# Patient Record
Sex: MALE | Race: WHITE | Employment: OTHER | URBAN - METROPOLITAN AREA
[De-identification: names, ages, dates, MRNs, and addresses within clinical notes are randomized per-mention and may not be internally consistent; named-entity substitution may affect disease eponyms.]

---

## 2017-01-04 ENCOUNTER — HOSPITAL ENCOUNTER (OUTPATIENT)
Dept: OTHER | Age: 67
Discharge: HOME | End: 2017-01-04
Attending: UROLOGY | Admitting: UROLOGY

## 2017-01-04 DIAGNOSIS — N20.0 CALCULUS OF KIDNEY: ICD-10-CM

## 2017-01-16 ENCOUNTER — HOSPITAL ENCOUNTER (OUTPATIENT)
Dept: GENERAL RADIOLOGY | Age: 67
Discharge: HOME | End: 2017-01-16
Attending: UROLOGY | Admitting: UROLOGY

## 2017-01-16 DIAGNOSIS — N20.0 CALCULUS OF KIDNEY: ICD-10-CM

## 2017-01-16 RX ORDER — SODIUM CHLORIDE 9 MG/ML
INJECTION, SOLUTION INTRAVENOUS
Status: DISPENSED
Start: 2017-01-16 | End: 2017-01-16

## 2017-03-27 PROBLEM — R00.1 BRADYCARDIA: Status: ACTIVE | Noted: 2017-03-27

## 2017-03-27 PROBLEM — I48.0 PAF (PAROXYSMAL ATRIAL FIBRILLATION) (HCC): Status: ACTIVE | Noted: 2017-03-27

## 2017-03-27 PROBLEM — R55 NEAR SYNCOPE: Status: ACTIVE | Noted: 2017-03-27

## 2017-03-27 PROBLEM — I10 HTN (HYPERTENSION): Status: ACTIVE | Noted: 2017-03-27

## 2017-03-27 PROBLEM — K21.9 GERD (GASTROESOPHAGEAL REFLUX DISEASE): Status: ACTIVE | Noted: 2017-03-27

## 2017-03-27 PROBLEM — N40.0 BPH (BENIGN PROSTATIC HYPERPLASIA): Status: ACTIVE | Noted: 2017-03-27

## 2017-03-27 PROBLEM — R00.2 PALPITATIONS: Status: ACTIVE | Noted: 2017-03-27

## 2017-04-06 ENCOUNTER — OFFICE VISIT (OUTPATIENT)
Dept: CARDIOLOGY CLINIC | Age: 67
End: 2017-04-06

## 2017-04-06 ENCOUNTER — NURSE ONLY (OUTPATIENT)
Dept: CARDIOLOGY CLINIC | Age: 67
End: 2017-04-06

## 2017-04-06 VITALS
WEIGHT: 172 LBS | BODY MASS INDEX: 26.15 KG/M2 | SYSTOLIC BLOOD PRESSURE: 140 MMHG | HEART RATE: 79 BPM | DIASTOLIC BLOOD PRESSURE: 92 MMHG

## 2017-04-06 DIAGNOSIS — I10 ESSENTIAL HYPERTENSION: ICD-10-CM

## 2017-04-06 DIAGNOSIS — I48.0 PAF (PAROXYSMAL ATRIAL FIBRILLATION) (HCC): Primary | ICD-10-CM

## 2017-04-06 DIAGNOSIS — I48.0 PAROXYSMAL ATRIAL FIBRILLATION (HCC): Primary | ICD-10-CM

## 2017-04-06 PROCEDURE — 99214 OFFICE O/P EST MOD 30 MIN: CPT | Performed by: NURSE PRACTITIONER

## 2017-04-06 PROCEDURE — 93000 ELECTROCARDIOGRAM COMPLETE: CPT | Performed by: NURSE PRACTITIONER

## 2017-04-06 RX ORDER — PROPAFENONE HYDROCHLORIDE 150 MG/1
150 TABLET, FILM COATED ORAL EVERY 8 HOURS SCHEDULED
Qty: 270 TABLET | Refills: 1 | Status: SHIPPED | OUTPATIENT
Start: 2017-04-06 | End: 2017-05-18 | Stop reason: SDUPTHER

## 2017-04-06 RX ORDER — METOPROLOL SUCCINATE 25 MG/1
12.5 TABLET, EXTENDED RELEASE ORAL DAILY
Qty: 30 TABLET | Refills: 3 | Status: SHIPPED | OUTPATIENT
Start: 2017-04-06 | End: 2017-05-18

## 2017-05-08 ENCOUNTER — TELEPHONE (OUTPATIENT)
Dept: CARDIOLOGY CLINIC | Age: 67
End: 2017-05-08

## 2017-05-09 PROCEDURE — 93268 ECG RECORD/REVIEW: CPT | Performed by: INTERNAL MEDICINE

## 2017-05-18 ENCOUNTER — OFFICE VISIT (OUTPATIENT)
Dept: CARDIOLOGY CLINIC | Age: 67
End: 2017-05-18

## 2017-05-18 VITALS
SYSTOLIC BLOOD PRESSURE: 118 MMHG | WEIGHT: 169.25 LBS | BODY MASS INDEX: 25.73 KG/M2 | HEART RATE: 72 BPM | DIASTOLIC BLOOD PRESSURE: 80 MMHG

## 2017-05-18 DIAGNOSIS — I48.0 PAF (PAROXYSMAL ATRIAL FIBRILLATION) (HCC): Primary | ICD-10-CM

## 2017-05-18 DIAGNOSIS — I10 ESSENTIAL HYPERTENSION: ICD-10-CM

## 2017-05-18 PROCEDURE — 99213 OFFICE O/P EST LOW 20 MIN: CPT | Performed by: NURSE PRACTITIONER

## 2017-05-18 PROCEDURE — 93000 ELECTROCARDIOGRAM COMPLETE: CPT | Performed by: NURSE PRACTITIONER

## 2017-05-18 RX ORDER — METOPROLOL SUCCINATE 25 MG/1
12.5 TABLET, EXTENDED RELEASE ORAL PRN
COMMUNITY
End: 2018-06-19 | Stop reason: ALTCHOICE

## 2017-05-18 RX ORDER — PROPAFENONE HYDROCHLORIDE 150 MG/1
150 TABLET, FILM COATED ORAL EVERY 8 HOURS SCHEDULED
Qty: 270 TABLET | Refills: 3 | Status: SHIPPED | OUTPATIENT
Start: 2017-05-18 | End: 2017-08-24 | Stop reason: ALTCHOICE

## 2017-05-30 ENCOUNTER — HOSPITAL ENCOUNTER (OUTPATIENT)
Dept: OTHER | Age: 67
Discharge: HOME | End: 2017-05-30
Attending: UROLOGY | Admitting: UROLOGY

## 2017-05-30 LAB
CALCIUM SERPL-MCNC: 8.4 MG/DL (ref 8.3–10.6)
PARATHYROID HORMONE INTACT: 54.3 PG/ML (ref 14–72)

## 2017-06-05 ENCOUNTER — HOSPITAL ENCOUNTER (OUTPATIENT)
Dept: CT IMAGING | Age: 67
Discharge: HOME | End: 2017-06-05
Attending: UROLOGY | Admitting: UROLOGY

## 2017-06-05 DIAGNOSIS — N20.1 CALCULUS OF URETER: ICD-10-CM

## 2017-06-05 LAB
BUN BLDV-MCNC: 12 MG/DL (ref 7–20)
CREAT SERPL-MCNC: 0.8 MG/DL (ref 0.8–1.3)
GFR AFRICAN AMERICAN: >60
GFR NON-AFRICAN AMERICAN: >60
PROSTATE SPECIFIC ANTIGEN: 1.79 NG/ML (ref 0–4)

## 2017-08-18 DIAGNOSIS — I48.0 PAF (PAROXYSMAL ATRIAL FIBRILLATION) (HCC): ICD-10-CM

## 2017-08-24 ENCOUNTER — OFFICE VISIT (OUTPATIENT)
Dept: CARDIOLOGY CLINIC | Age: 67
End: 2017-08-24

## 2017-08-24 VITALS
WEIGHT: 170.6 LBS | BODY MASS INDEX: 25.94 KG/M2 | SYSTOLIC BLOOD PRESSURE: 124 MMHG | DIASTOLIC BLOOD PRESSURE: 88 MMHG | HEART RATE: 62 BPM

## 2017-08-24 DIAGNOSIS — I10 ESSENTIAL HYPERTENSION: ICD-10-CM

## 2017-08-24 DIAGNOSIS — I48.0 PAF (PAROXYSMAL ATRIAL FIBRILLATION) (HCC): Primary | ICD-10-CM

## 2017-08-24 PROCEDURE — 93000 ELECTROCARDIOGRAM COMPLETE: CPT | Performed by: NURSE PRACTITIONER

## 2017-08-24 PROCEDURE — 99213 OFFICE O/P EST LOW 20 MIN: CPT | Performed by: NURSE PRACTITIONER

## 2017-08-24 RX ORDER — PROPAFENONE HYDROCHLORIDE 225 MG/1
225 CAPSULE, EXTENDED RELEASE ORAL 2 TIMES DAILY
Qty: 60 CAPSULE | Refills: 5 | Status: SHIPPED | OUTPATIENT
Start: 2017-08-24 | End: 2018-02-21 | Stop reason: SDUPTHER

## 2017-11-07 ENCOUNTER — TELEPHONE (OUTPATIENT)
Dept: CARDIOLOGY CLINIC | Age: 67
End: 2017-11-07

## 2017-11-07 DIAGNOSIS — I48.0 PAROXYSMAL ATRIAL FIBRILLATION (HCC): Primary | ICD-10-CM

## 2017-11-07 NOTE — TELEPHONE ENCOUNTER
Pt wife calling, pt is on AFib medication for several months now, last night after work he came home from work, ate and he started in AFib for about 2-2.5 hours and then started to slow down. Right now he is back to normal at 61. Pt would like to know how this can happen if he's on medication for this? Pt has a few other questions too, would like to talk to [facility] about them.   Pls call to advise Thank you

## 2017-11-07 NOTE — TELEPHONE ENCOUNTER
Spoke with pt's wife. Pt had first episode of AF since starting Propafenone in the spring. Palpitations lasted for 2-2.5 hr, did not take Metoprolol. Wife believes he is apneic during the night with loud snoring. He has never had a sleep study. Referral placed to sleep medicine. No change in medications.

## 2018-02-12 ENCOUNTER — OFFICE VISIT (OUTPATIENT)
Dept: CARDIOLOGY CLINIC | Age: 68
End: 2018-02-12

## 2018-02-12 VITALS
OXYGEN SATURATION: 97 % | WEIGHT: 178.4 LBS | HEART RATE: 68 BPM | SYSTOLIC BLOOD PRESSURE: 136 MMHG | HEIGHT: 68 IN | DIASTOLIC BLOOD PRESSURE: 86 MMHG | BODY MASS INDEX: 27.04 KG/M2

## 2018-02-12 DIAGNOSIS — I48.0 PAF (PAROXYSMAL ATRIAL FIBRILLATION) (HCC): Primary | ICD-10-CM

## 2018-02-12 DIAGNOSIS — R00.2 PALPITATIONS: ICD-10-CM

## 2018-02-12 PROCEDURE — 3017F COLORECTAL CA SCREEN DOC REV: CPT | Performed by: INTERNAL MEDICINE

## 2018-02-12 PROCEDURE — G8419 CALC BMI OUT NRM PARAM NOF/U: HCPCS | Performed by: INTERNAL MEDICINE

## 2018-02-12 PROCEDURE — 93000 ELECTROCARDIOGRAM COMPLETE: CPT | Performed by: INTERNAL MEDICINE

## 2018-02-12 PROCEDURE — 1036F TOBACCO NON-USER: CPT | Performed by: INTERNAL MEDICINE

## 2018-02-12 PROCEDURE — 4040F PNEUMOC VAC/ADMIN/RCVD: CPT | Performed by: INTERNAL MEDICINE

## 2018-02-12 PROCEDURE — G8484 FLU IMMUNIZE NO ADMIN: HCPCS | Performed by: INTERNAL MEDICINE

## 2018-02-12 PROCEDURE — 99214 OFFICE O/P EST MOD 30 MIN: CPT | Performed by: INTERNAL MEDICINE

## 2018-02-12 PROCEDURE — G8427 DOCREV CUR MEDS BY ELIG CLIN: HCPCS | Performed by: INTERNAL MEDICINE

## 2018-02-12 PROCEDURE — 1123F ACP DISCUSS/DSCN MKR DOCD: CPT | Performed by: INTERNAL MEDICINE

## 2018-02-12 NOTE — PROGRESS NOTES
Colonoscopy (10/28/14). Social History:   reports that he has never smoked. He has never used smokeless tobacco. He reports that he does not drink alcohol or use drugs. Family History:  family history includes Cancer in his brother, brother, and mother; Heart Disease in his brother, father, and sister. Home Medications:    Current Outpatient Prescriptions:     propafenone (RYTHMOL SR) 225 MG extended release capsule, Take 1 capsule by mouth 2 times daily, Disp: 60 capsule, Rfl: 5    metoprolol succinate (TOPROL XL) 25 MG extended release tablet, Take 12.5 mg by mouth as needed, Disp: , Rfl:     ranitidine (ZANTAC) 150 MG tablet, Take 150 mg by mouth daily, Disp: , Rfl:     aspirin 81 MG tablet, Take 81 mg by mouth daily, Disp: , Rfl:     Multiple Vitamins-Minerals (MULTIVITAMIN ADULT PO), Take 1 tablet by mouth daily, Disp: , Rfl:     MAGNESIUM PO, Take 1 capsule by mouth daily, Disp: , Rfl:     Coenzyme Q10 (COQ10 PO), Take 1 tablet by mouth daily, Disp: , Rfl:     Omega-3 Fatty Acids (OMEGA 3 PO), Take 1 tablet by mouth daily, Disp: , Rfl:       Allergies:  Review of patient's allergies indicates no known allergies. Review of Systems   Constitutional: Negative for activity change and appetite change. HENT: Negative for facial swelling and neck pain. Eyes: Negative for discharge and itching. Respiratory: Resps easy, unlabored, - chest pain and SOB  Cardiovascular: + palpitations. Negative for leg swelling. Gastrointestinal: Negative for abdominal pain and abdominal distention. Genitourinary: Negative. Musculoskeletal: Negative. Skin: Negative for color change and pallor. Neurological: Negative for dizziness, syncope and light-headedness. Hematological: Negative. Psychiatric/Behavioral: Negative for behavioral problems and agitation.     /86 (Site: Left Arm, Position: Sitting, Cuff Size: Large Adult)   Pulse 68   Ht 5' 8\" (1.727 m)   Wt 178 lb 6.4 oz

## 2018-02-21 ENCOUNTER — TELEPHONE (OUTPATIENT)
Dept: CARDIOLOGY CLINIC | Age: 68
End: 2018-02-21

## 2018-02-21 RX ORDER — PROPAFENONE HYDROCHLORIDE 225 MG/1
225 CAPSULE, EXTENDED RELEASE ORAL 2 TIMES DAILY
Qty: 60 CAPSULE | Refills: 5 | Status: SHIPPED | OUTPATIENT
Start: 2018-02-21 | End: 2018-08-23 | Stop reason: SDUPTHER

## 2018-02-21 NOTE — TELEPHONE ENCOUNTER
Last OV 2/12/2018, propafenone was not refilled at this visit. Approved refill per assessment of visit  Assessment:  1. Atrial Fibrillation, unspecified--intermittent episodes describes has racing/skipped heart beats that worse with stress. His [id number] score is 1. The risks, benefits, alternatives related to anticoagulation were reviewed. He declined NOAC therapy at this time, will continue ASA.     Continue Propafenone 225 mg bid

## 2018-03-05 ENCOUNTER — HOSPITAL ENCOUNTER (OUTPATIENT)
Dept: OTHER | Age: 68
Discharge: HOME | End: 2018-03-05
Attending: UROLOGY | Admitting: UROLOGY

## 2018-03-05 DIAGNOSIS — N20.1 CALCULUS OF URETER: ICD-10-CM

## 2018-03-08 ENCOUNTER — HOSPITAL ENCOUNTER (OUTPATIENT)
Dept: ULTRASOUND IMAGING | Age: 68
Discharge: HOME | End: 2018-03-08
Attending: UROLOGY | Admitting: UROLOGY

## 2018-03-08 DIAGNOSIS — R31.21 ASYMPTOMATIC MICROSCOPIC HEMATURIA: ICD-10-CM

## 2018-04-20 ENCOUNTER — TELEPHONE (OUTPATIENT)
Dept: FAMILY MEDICINE CLINIC | Age: 68
End: 2018-04-20

## 2018-04-20 ENCOUNTER — HOSPITAL ENCOUNTER (OUTPATIENT)
Dept: CT IMAGING | Age: 68
Discharge: HOME | End: 2018-04-20
Attending: UROLOGY | Admitting: UROLOGY

## 2018-04-20 DIAGNOSIS — N20.1 CALCULUS OF URETER: ICD-10-CM

## 2018-05-30 ENCOUNTER — TELEPHONE (OUTPATIENT)
Dept: CARDIOLOGY CLINIC | Age: 68
End: 2018-05-30

## 2018-06-12 ENCOUNTER — TELEPHONE (OUTPATIENT)
Dept: CARDIOLOGY CLINIC | Age: 68
End: 2018-06-12

## 2018-06-22 ENCOUNTER — OFFICE VISIT (OUTPATIENT)
Dept: VASCULAR SURGERY | Age: 68
End: 2018-06-22

## 2018-06-22 VITALS
DIASTOLIC BLOOD PRESSURE: 80 MMHG | HEIGHT: 68 IN | BODY MASS INDEX: 26.22 KG/M2 | SYSTOLIC BLOOD PRESSURE: 124 MMHG | WEIGHT: 173 LBS

## 2018-06-22 DIAGNOSIS — I72.8 SPLENIC ARTERY ANEURYSM (HCC): Primary | ICD-10-CM

## 2018-06-22 PROCEDURE — 1123F ACP DISCUSS/DSCN MKR DOCD: CPT | Performed by: SURGERY

## 2018-06-22 PROCEDURE — 99203 OFFICE O/P NEW LOW 30 MIN: CPT | Performed by: SURGERY

## 2018-06-22 PROCEDURE — G8427 DOCREV CUR MEDS BY ELIG CLIN: HCPCS | Performed by: SURGERY

## 2018-06-22 PROCEDURE — 3017F COLORECTAL CA SCREEN DOC REV: CPT | Performed by: SURGERY

## 2018-06-22 PROCEDURE — 1036F TOBACCO NON-USER: CPT | Performed by: SURGERY

## 2018-06-22 PROCEDURE — G8419 CALC BMI OUT NRM PARAM NOF/U: HCPCS | Performed by: SURGERY

## 2018-06-22 PROCEDURE — 4040F PNEUMOC VAC/ADMIN/RCVD: CPT | Performed by: SURGERY

## 2018-06-27 NOTE — TELEPHONE ENCOUNTER
Pt wife calling stating pt needs a refill of Metoprolol (I don't see it listed on current medication list) [address] [phone]. Pt is in AFib again.   Pls call to advise Thank you

## 2018-06-29 PROBLEM — N20.0 RENAL STONES: Status: ACTIVE | Noted: 2018-06-29

## 2018-07-03 RX ORDER — METOPROLOL SUCCINATE 25 MG/1
12.5 TABLET, EXTENDED RELEASE ORAL DAILY PRN
Qty: 90 TABLET | Refills: 3 | Status: SHIPPED | OUTPATIENT
Start: 2018-07-03 | End: 2018-09-10 | Stop reason: SDUPTHER

## 2018-07-03 NOTE — TELEPHONE ENCOUNTER
Has this been taken care of? I don't see where the this medication has been ordered.  Pls advise Thank you

## 2018-07-10 ENCOUNTER — HOSPITAL ENCOUNTER (OUTPATIENT)
Dept: OTHER | Age: 68
Discharge: HOME | End: 2018-07-10
Attending: UROLOGY | Admitting: UROLOGY

## 2018-07-10 DIAGNOSIS — R52 PAIN: ICD-10-CM

## 2018-07-31 ENCOUNTER — HOSPITAL ENCOUNTER (OUTPATIENT)
Dept: SURGERY | Age: 68
Discharge: HOME | End: 2018-07-31
Attending: UROLOGY | Admitting: UROLOGY

## 2018-07-31 VITALS
WEIGHT: 169.2 LBS | RESPIRATION RATE: 18 BRPM | TEMPERATURE: 98.3 F | BODY MASS INDEX: 25.64 KG/M2 | HEIGHT: 68 IN | OXYGEN SATURATION: 93 % | HEART RATE: 89 BPM | SYSTOLIC BLOOD PRESSURE: 144 MMHG | DIASTOLIC BLOOD PRESSURE: 90 MMHG

## 2018-07-31 DIAGNOSIS — N20.1 CALCULUS OF URETER: ICD-10-CM

## 2018-07-31 LAB
ANION GAP SERPL CALCULATED.3IONS-SCNC: 12 MMOL/L (ref 3–16)
BUN BLDV-MCNC: 8 MG/DL (ref 7–20)
CALCIUM SERPL-MCNC: 9.3 MG/DL (ref 8.3–10.6)
CHLORIDE BLD-SCNC: 106 MMOL/L (ref 99–110)
CO2: 24 MMOL/L (ref 21–32)
CREAT SERPL-MCNC: 0.9 MG/DL (ref 0.8–1.3)
GFR AFRICAN AMERICAN: >60
GFR NON-AFRICAN AMERICAN: >60
GLUCOSE BLD-MCNC: 101 MG/DL (ref 70–99)
HCT VFR BLD CALC: 46.6 % (ref 40.5–52.5)
HEMOGLOBIN: 16.1 G/DL (ref 13.5–17.5)
MCH RBC QN AUTO: 30.6 PG (ref 26–34)
MCHC RBC AUTO-ENTMCNC: 34.5 G/DL (ref 31–36)
MCV RBC AUTO: 88.5 FL (ref 80–100)
PDW BLD-RTO: 13.4 % (ref 12.4–15.4)
PLATELET # BLD: 234 K/UL (ref 135–450)
PMV BLD AUTO: 8.8 FL (ref 5–10.5)
POTASSIUM SERPL-SCNC: 4.2 MMOL/L (ref 3.5–5.1)
RBC # BLD: 5.26 M/UL (ref 4.2–5.9)
SODIUM BLD-SCNC: 142 MMOL/L (ref 136–145)
WBC # BLD: 9.5 K/UL (ref 4–11)

## 2018-07-31 RX ORDER — SODIUM CHLORIDE 0.9 % (FLUSH) 0.9 %
10 SYRINGE (ML) INJECTION PRN
Status: DISCONTINUED | OUTPATIENT
Start: 2018-07-31 | End: 2018-08-01 | Stop reason: HOSPADM

## 2018-07-31 RX ORDER — CEFAZOLIN SODIUM 2 G/100ML
2 INJECTION, SOLUTION INTRAVENOUS
Status: COMPLETED | OUTPATIENT
Start: 2018-07-31 | End: 2018-07-31

## 2018-07-31 RX ORDER — HYDROMORPHONE HCL 110MG/55ML
0.5 PATIENT CONTROLLED ANALGESIA SYRINGE INTRAVENOUS EVERY 5 MIN PRN
Status: DISCONTINUED | OUTPATIENT
Start: 2018-07-31 | End: 2018-08-01 | Stop reason: HOSPADM

## 2018-07-31 RX ORDER — SODIUM CHLORIDE, SODIUM LACTATE, POTASSIUM CHLORIDE, CALCIUM CHLORIDE 600; 310; 30; 20 MG/100ML; MG/100ML; MG/100ML; MG/100ML
INJECTION, SOLUTION INTRAVENOUS CONTINUOUS
Status: DISCONTINUED | OUTPATIENT
Start: 2018-07-31 | End: 2018-08-01 | Stop reason: HOSPADM

## 2018-07-31 RX ORDER — MEPERIDINE HYDROCHLORIDE 25 MG/ML
12.5 INJECTION INTRAMUSCULAR; INTRAVENOUS; SUBCUTANEOUS EVERY 5 MIN PRN
Status: DISCONTINUED | OUTPATIENT
Start: 2018-07-31 | End: 2018-08-01 | Stop reason: HOSPADM

## 2018-07-31 RX ORDER — LABETALOL HYDROCHLORIDE 5 MG/ML
5 INJECTION, SOLUTION INTRAVENOUS EVERY 10 MIN PRN
Status: DISCONTINUED | OUTPATIENT
Start: 2018-07-31 | End: 2018-08-01 | Stop reason: HOSPADM

## 2018-07-31 RX ORDER — HYDROCODONE BITARTRATE AND ACETAMINOPHEN 5; 325 MG/1; MG/1
1 TABLET ORAL EVERY 6 HOURS PRN
Qty: 20 TABLET | Refills: 0 | Status: SHIPPED | OUTPATIENT
Start: 2018-07-31 | End: 2018-08-05

## 2018-07-31 RX ORDER — ONDANSETRON 2 MG/ML
4 INJECTION INTRAMUSCULAR; INTRAVENOUS
Status: ACTIVE | OUTPATIENT
Start: 2018-07-31 | End: 2018-07-31

## 2018-07-31 RX ORDER — HYDRALAZINE HYDROCHLORIDE 20 MG/ML
5 INJECTION INTRAMUSCULAR; INTRAVENOUS EVERY 10 MIN PRN
Status: DISCONTINUED | OUTPATIENT
Start: 2018-07-31 | End: 2018-08-01 | Stop reason: HOSPADM

## 2018-07-31 RX ORDER — LIDOCAINE HYDROCHLORIDE 10 MG/ML
1 INJECTION, SOLUTION EPIDURAL; INFILTRATION; INTRACAUDAL; PERINEURAL
Status: ACTIVE | OUTPATIENT
Start: 2018-07-31 | End: 2018-07-31

## 2018-07-31 RX ORDER — SODIUM CHLORIDE 0.9 % (FLUSH) 0.9 %
10 SYRINGE (ML) INJECTION EVERY 12 HOURS SCHEDULED
Status: DISCONTINUED | OUTPATIENT
Start: 2018-07-31 | End: 2018-08-01 | Stop reason: HOSPADM

## 2018-07-31 RX ORDER — OXYCODONE HYDROCHLORIDE AND ACETAMINOPHEN 5; 325 MG/1; MG/1
1 TABLET ORAL
Status: ACTIVE | OUTPATIENT
Start: 2018-07-31 | End: 2018-07-31

## 2018-07-31 RX ORDER — LIDOCAINE HYDROCHLORIDE 10 MG/ML
0.5 INJECTION, SOLUTION EPIDURAL; INFILTRATION; INTRACAUDAL; PERINEURAL ONCE
Status: DISCONTINUED | OUTPATIENT
Start: 2018-07-31 | End: 2018-08-01 | Stop reason: HOSPADM

## 2018-07-31 RX ADMIN — SODIUM CHLORIDE, SODIUM LACTATE, POTASSIUM CHLORIDE, CALCIUM CHLORIDE: 600; 310; 30; 20 INJECTION, SOLUTION INTRAVENOUS at 11:58

## 2018-07-31 RX ADMIN — CEFAZOLIN SODIUM 2 G: 2 INJECTION, SOLUTION INTRAVENOUS at 13:03

## 2018-07-31 ASSESSMENT — PAIN DESCRIPTION - LOCATION: LOCATION: FLANK

## 2018-07-31 ASSESSMENT — PAIN DESCRIPTION - ORIENTATION: ORIENTATION: LEFT

## 2018-07-31 ASSESSMENT — PAIN SCALES - GENERAL: PAINLEVEL_OUTOF10: 5

## 2018-07-31 ASSESSMENT — PAIN DESCRIPTION - PAIN TYPE: TYPE: SURGICAL PAIN

## 2018-07-31 NOTE — PROGRESS NOTES
Pt arrived from pacu in stable condition. VSS. Pt alert and oriented. Pt states his surgical pain is a 4-5/10. Pt denies any nausea. Stent with string in place, pt aware. Will provided pt with PO fluids and crackers. Outpatient pharmacy called. Will bring family to bedside. Will give primary RN, Ruchi, report.

## 2018-07-31 NOTE — OP NOTE
Urology Operative Report  [facility]    Provider: Michaele Mortimer MD Patient ID:  Admission Date: 2018 Name: Jorge Levi  OR Date: 2018  MRN: [id number]   Patient Location: Room/bed info not found : 1950  Attending: Michaele Mortimer, MD Date of Service: 2018  PCP: Jessica Stewart MD     Date of Operation: 2018    Preoperative Diagnosis: LEFT side renal stone    Postoperative Diagnosis: same    Procedure:    1. Cystoscopy  2. Left side ureteral stent removal  3. Left ureteroscopy  4. Laser lithotripsy  5. Basket extraction of stone  6. Left side ureteral stent placement  7. Fluoroscopy <1hr MD time  8. Left side retrograde ureteropyelogram    Surgeon:   Michaele Mortimer, MD    Anesthesia: General anesthesia    Indications: Jorge Levi is a 80 y.o. male who presents for the above named surgery. Informed consent was obtained and the risks, benefits, and details of the procedure were explained to the patient who elected to proceed. Details of Procedure: The patient was brought to the operating room and placed in the supine position on the operating room table. SCDs were placed on the lower extremities. Following induction of anesthesia the patient was positioned in a lithotomy position, all pressure points were padded, and the genitals were prepped and draped in the usual sterile fashion. A routine timeout was performed, confirming the patient, procedure, site, risk of fire, patient allergies and confirming that preoperative antibiotics had been administered prior to beginning. A 21 fr rigid cystoscope was advance via a normal appearing urethra into the bladder. The bladder was inspected and there were no suspicious lesions, stones or diverticula seen. Attention was turned to the left ureteral orifice and the previously placed ureteral stent was gasped and removed to the urethral meatus.  A 0.035 sensor wire was advanced via the lumen of the stent and positioned within the collecting system under control of fluoroscopy. A 5 fr pollock cathter was positioned and a RUPG was done to identify the anatomy. there was no extravasation, and no evidence of stricture. The wire was replaced. Over the wire a 11/13 by 36 cm ureteral access sheath was positioned within the ureter. A flexible ureteroscopy was advanced until the stone was identified. A 200nm laser fiber was used to perform lithotripsy until all stone burden was fragmented. Multiple small stone fragments were grasped and removed using a 0-tip nitinol basket. This was continued until all significant stone burden was removed. The ureteroscope was advanced up to the renal collecting system which was inspected systematically and no residual stone burden was seen. The working wire was replaced into the collecting system under vision. The ureter was inspected on removal of the scope and access sheath and no additional stone burden was seen. The cystoscope was back-loaded over the wire and a stent was advanced under control of fluoroscopy with good curl in the renal pelvis and the urinary bladder. A string was left attached to the distal stent and brought out through the urethral meatus. The bladder was emptied and the scope removed. At the end of the procedure all counts were correct. The patient tolerated the procedure well and was transported to the PACU in stable condition. Findings: successful treatment of the remaining renal stone burden    Estimated Blood Loss: minimal                  Drains: 6fr x 26cm left ureteral stent          Specimens: none    Complications: none apparent           Disposition:  PACU - hemodynamically stable.             Santa Barrientos MD  7/31/2018

## 2018-07-31 NOTE — PROGRESS NOTES
Pt discharged home per private vehicle with a  responsible adult who states they will be with them for the next 24 hours. Wheeled to front of the hospital by [facility].

## 2018-07-31 NOTE — PROGRESS NOTES
Arrived from cysto to pacu. Not yet awake. Resps adequate on O2 per simple mask and oral airway. VSS.

## 2018-08-24 RX ORDER — PROPAFENONE HYDROCHLORIDE 225 MG/1
225 CAPSULE, EXTENDED RELEASE ORAL 2 TIMES DAILY
Qty: 60 CAPSULE | Refills: 5 | Status: SHIPPED | OUTPATIENT
Start: 2018-08-24 | End: 2018-09-10 | Stop reason: SDUPTHER

## 2018-09-10 ENCOUNTER — OFFICE VISIT (OUTPATIENT)
Dept: CARDIOLOGY CLINIC | Age: 68
End: 2018-09-10

## 2018-09-10 VITALS
BODY MASS INDEX: 26.43 KG/M2 | HEIGHT: 68 IN | HEART RATE: 70 BPM | SYSTOLIC BLOOD PRESSURE: 138 MMHG | WEIGHT: 174.4 LBS | DIASTOLIC BLOOD PRESSURE: 88 MMHG

## 2018-09-10 DIAGNOSIS — I48.0 PAF (PAROXYSMAL ATRIAL FIBRILLATION) (HCC): Primary | ICD-10-CM

## 2018-09-10 PROCEDURE — G8419 CALC BMI OUT NRM PARAM NOF/U: HCPCS | Performed by: INTERNAL MEDICINE

## 2018-09-10 PROCEDURE — 4040F PNEUMOC VAC/ADMIN/RCVD: CPT | Performed by: INTERNAL MEDICINE

## 2018-09-10 PROCEDURE — 99214 OFFICE O/P EST MOD 30 MIN: CPT | Performed by: INTERNAL MEDICINE

## 2018-09-10 PROCEDURE — 1123F ACP DISCUSS/DSCN MKR DOCD: CPT | Performed by: INTERNAL MEDICINE

## 2018-09-10 PROCEDURE — 3017F COLORECTAL CA SCREEN DOC REV: CPT | Performed by: INTERNAL MEDICINE

## 2018-09-10 PROCEDURE — G8427 DOCREV CUR MEDS BY ELIG CLIN: HCPCS | Performed by: INTERNAL MEDICINE

## 2018-09-10 PROCEDURE — 1036F TOBACCO NON-USER: CPT | Performed by: INTERNAL MEDICINE

## 2018-09-10 PROCEDURE — 93000 ELECTROCARDIOGRAM COMPLETE: CPT | Performed by: INTERNAL MEDICINE

## 2018-09-10 PROCEDURE — 1101F PT FALLS ASSESS-DOCD LE1/YR: CPT | Performed by: INTERNAL MEDICINE

## 2018-09-10 RX ORDER — METOPROLOL SUCCINATE 25 MG/1
12.5 TABLET, EXTENDED RELEASE ORAL DAILY PRN
Qty: 90 TABLET | Refills: 3 | Status: SHIPPED | OUTPATIENT
Start: 2018-09-10 | End: 2020-08-13 | Stop reason: ALTCHOICE

## 2018-09-10 RX ORDER — PROPAFENONE HYDROCHLORIDE 225 MG/1
225 CAPSULE, EXTENDED RELEASE ORAL 2 TIMES DAILY
Qty: 180 CAPSULE | Refills: 5 | Status: SHIPPED | OUTPATIENT
Start: 2018-09-10 | End: 2019-03-26 | Stop reason: SDUPTHER

## 2018-09-10 RX ORDER — PROPAFENONE HYDROCHLORIDE 225 MG/1
225 CAPSULE, EXTENDED RELEASE ORAL 2 TIMES DAILY
Qty: 180 CAPSULE | Refills: 5 | Status: SHIPPED | OUTPATIENT
Start: 2018-09-10 | End: 2018-09-10 | Stop reason: SDUPTHER

## 2018-09-10 NOTE — PROGRESS NOTES
[address]   Cardiac Follow up    Referring Provider:  Jessica Stewart MD     Chief Complaint   Patient presents with    Atrial Fibrillation     on propafenone    Palpitations         HPI:  Jorge Levi is a 80 y.o. male seen in hospital for consultation of paroxsymal atrial fibrillation. He has a history of Atrial Fibrillation, Splenic Artery Aneurysm, Diverticulitis, and HTN. He reports that for a couple of years he has had worsening afib episodes and at the time they occur his pulse oximetry shows his HR in the 140's. He was admitted for likely brief episodes of afib which he felt and caused significant fear. He has a low [id number] score of 1( age, ? Ht though) by his hx. He has hematuria from kidney stones. He has a neg stress test a year ago. He does not smoke or drink. He only has sx when feeling an irregular heart beat. He was started on Propafenone on discharge. Referral for sleep study was done by SR NP in 8/17 due to call from wife stating pt going into Afib for a few hours at a time intermittently. She states she believes he has sleep apnea because he snores. Sleep study was not completed due to time constraints. Stress and illness seems to bring Afib on more frequently. Pt not on NOAC at this time because of employment being in an area that is high risk for injury. Pt states his brother had GI bleed and many surgeries due to taking NOAC. Is taking ASA 81mg daily. Has Rx for metropolol, but has not needed to take it.  arrives to office today in [facility]. He states his last episode of atrial fib was two weeks ago. Discussed NOAC, pt states he is not interested at this time. Patient denies lightheadedness, dizziness, chest pain, palpitations, orthopnea, edema, presyncope or syncope. Past Medical History:   has a past medical history of Atrial fibrillation ([address] Chronic kidney disease; Diverticulitis; GERD (gastroesophageal reflux disease);  Kidney Musculoskeletal: Negative. Skin: Negative for color change and pallor. Neurological: Negative for dizziness, syncope and light-headedness. Hematological: Negative. Psychiatric/Behavioral: Negative for behavioral problems and agitation. /88 (Site: Left Upper Arm, Position: Sitting, Cuff Size: Medium Adult)   Pulse 70   Ht 5' 8\" (1.727 m)   Wt 174 lb 6.4 oz (79.1 kg)   BMI 26.52 kg/m²   Stable. LABS:   Cr 0.9 (7/2018)    Objective:  Physical Exam   Nursing note and vitals reviewed. Constitutional: He appears well-developed and well-nourished. Head: atraumatic. facial nevus and left facial mole  Eyes: Right eye exhibits no discharge. Left eye exhibits no discharge. Neck: Neck supple. Cardiovascular: Normal rate, regular rhythm and normal heart sounds. Pulmonary/Chest: Effort normal and breath sounds normal.   Abdominal: Soft. Musculoskeletal: He exhibits no edema. Neurological: He is alert without any gross abnormalities. Skin: Skin is warm and dry. Psychiatric: He has a normal mood and affect. ECHO: 3/27/2017   -Normal left ventricle size and systolic function with an estimated ejection   fraction of 60-65%.  -No regional wall motion abnormalities are seen.   -Borderline concentric left ventricular hypertrophy is present.   -Normal diastolic function. E/e'= 7.2 .   -There is trivial mitral and tricuspid regurgitation with RVSP estimated at   26 mmHg. Stress ECHO: 12/21/15  Normal stress study    ECG:   Sinus  Rhythm  -First degree A-V block   -Right bundle branch block with left axis -bifascicular block. 70bpm    MCOT:  Completed 4/2017    Anticoagulation:  ASA - [id number] is 1    Assessment:  1. Atrial Fibrillation, Paroxysmal- intermittent episodes describes has racing/skipped heart beats. His [id number] score is 1, continue aspirin 81mg daily  The risks, benefits, alternatives related to anticoagulation were reviewed.  He declined NOAC therapy at this

## 2018-09-10 NOTE — PATIENT INSTRUCTIONS
Patient Education        Atrial Fibrillation: Care Instructions  Your Care Instructions    Atrial fibrillation is an irregular and often fast heartbeat. Treating this condition is important for several reasons. It can cause blood clots, which can travel from your heart to your brain and cause a stroke. If you have a fast heartbeat, you may feel lightheaded, dizzy, and weak. An irregular heartbeat can also increase your risk for heart failure. Atrial fibrillation is often the result of another heart condition, such as high blood pressure or coronary artery disease. Making changes to improve your heart condition will help you stay healthy and active. Follow-up care is a key part of your treatment and safety. Be sure to make and go to all appointments, and call your doctor if you are having problems. It's also a good idea to know your test results and keep a list of the medicines you take. How can you care for yourself at home? Medicines    · Take your medicines exactly as prescribed. Call your doctor if you think you are having a problem with your medicine. You will get more details on the specific medicines your doctor prescribes.     · If your doctor has given you a blood thinner to prevent a stroke, be sure you get instructions about how to take your medicine safely. Blood thinners can cause serious bleeding problems.     · Do not take any vitamins, over-the-counter drugs, or herbal products without talking to your doctor first.    Lifestyle changes    · Do not smoke. Smoking can increase your chance of a stroke and heart attack. If you need help quitting, talk to your doctor about stop-smoking programs and medicines. These can increase your chances of quitting for good.     · Eat a heart-healthy diet.     · Stay at a healthy weight. Lose weight if you need to.     · Limit alcohol to 2 drinks a day for men and 1 drink a day for women. Too much alcohol can cause health problems.     · Avoid colds and flu.  Get a pneumococcal vaccine shot. If you have had one before, ask your doctor whether you need another dose. Get a flu shot every year. If you must be around people with colds or flu, wash your hands often. Activity    · If your doctor recommends it, get more exercise. Walking is a good choice. Bit by bit, increase the amount you walk every day. Try for at least 30 minutes on most days of the week. You also may want to swim, bike, or do other activities. Your doctor may suggest that you join a cardiac rehabilitation program so that you can have help increasing your physical activity safely.     · Start light exercise if your doctor says it is okay. Even a small amount will help you get stronger, have more energy, and manage stress. Walking is an easy way to get exercise. Start out by walking a little more than you did in the hospital. Gradually increase the amount you walk.     · When you exercise, watch for signs that your heart is working too hard. You are pushing too hard if you cannot talk while you are exercising. If you become short of breath or dizzy or have chest pain, sit down and rest immediately.     · Check your pulse regularly. Place two fingers on the artery at the palm side of your wrist, in line with your thumb. If your heartbeat seems uneven or fast, talk to your doctor. When should you call for help? Call 911 anytime you think you may need emergency care. For example, call if:    · You have symptoms of a heart attack. These may include:  ¨ Chest pain or pressure, or a strange feeling in the chest.  ¨ Sweating. ¨ Shortness of breath. ¨ Nausea or vomiting. ¨ Pain, pressure, or a strange feeling in the back, neck, jaw, or upper belly or in one or both shoulders or arms. ¨ Lightheadedness or sudden weakness. ¨ A fast or irregular heartbeat. After you call 911, the  may tell you to chew 1 adult-strength or 2 to 4 low-dose aspirin. Wait for an ambulance.  Do not try to drive yourself.     · You have symptoms of a stroke. These may include:  ¨ Sudden numbness, tingling, weakness, or loss of movement in your face, arm, or leg, especially on only one side of your body. ¨ Sudden vision changes. ¨ Sudden trouble speaking. ¨ Sudden confusion or trouble understanding simple statements. ¨ Sudden problems with walking or balance. ¨ A sudden, severe headache that is different from past headaches.     · You passed out (lost consciousness).    Call your doctor now or seek immediate medical care if:    · You have new or increased shortness of breath.     · You feel dizzy or lightheaded, or you feel like you may faint.     · Your heart rate becomes irregular.     · You can feel your heart flutter in your chest or skip heartbeats. Tell your doctor if these symptoms are new or worse.    Watch closely for changes in your health, and be sure to contact your doctor if you have any problems. Where can you learn more? Go to https://[employer]. org and sign in to your [employer] account. Enter U020 in the [employer] box to learn more about \"Atrial Fibrillation: Care Instructions. \"     If you do not have an account, please click on the \"Sign Up Now\" link. Current as of: December 6, 2017  Content Version: 11.7  © 0435-3271 [employer]. Care instructions adapted under license by [facility] ([facility]). If you have questions about a medical condition or this instruction, always ask your healthcare professional. [place] [address] any warranty or liability for your use of this information.

## 2019-03-26 ENCOUNTER — OFFICE VISIT (OUTPATIENT)
Dept: CARDIOLOGY CLINIC | Age: 69
End: 2019-03-26
Payer: COMMERCIAL

## 2019-03-26 VITALS
BODY MASS INDEX: 24.83 KG/M2 | WEIGHT: 163.8 LBS | HEART RATE: 60 BPM | OXYGEN SATURATION: 98 % | SYSTOLIC BLOOD PRESSURE: 136 MMHG | HEIGHT: 68 IN | DIASTOLIC BLOOD PRESSURE: 84 MMHG

## 2019-03-26 DIAGNOSIS — I48.0 PAF (PAROXYSMAL ATRIAL FIBRILLATION) (HCC): Primary | ICD-10-CM

## 2019-03-26 DIAGNOSIS — R00.1 BRADYCARDIA: ICD-10-CM

## 2019-03-26 PROCEDURE — 4040F PNEUMOC VAC/ADMIN/RCVD: CPT | Performed by: INTERNAL MEDICINE

## 2019-03-26 PROCEDURE — 93000 ELECTROCARDIOGRAM COMPLETE: CPT | Performed by: INTERNAL MEDICINE

## 2019-03-26 PROCEDURE — 1123F ACP DISCUSS/DSCN MKR DOCD: CPT | Performed by: INTERNAL MEDICINE

## 2019-03-26 PROCEDURE — 1036F TOBACCO NON-USER: CPT | Performed by: INTERNAL MEDICINE

## 2019-03-26 PROCEDURE — G8420 CALC BMI NORM PARAMETERS: HCPCS | Performed by: INTERNAL MEDICINE

## 2019-03-26 PROCEDURE — 99214 OFFICE O/P EST MOD 30 MIN: CPT | Performed by: INTERNAL MEDICINE

## 2019-03-26 PROCEDURE — G8427 DOCREV CUR MEDS BY ELIG CLIN: HCPCS | Performed by: INTERNAL MEDICINE

## 2019-03-26 PROCEDURE — G8484 FLU IMMUNIZE NO ADMIN: HCPCS | Performed by: INTERNAL MEDICINE

## 2019-03-26 PROCEDURE — 3017F COLORECTAL CA SCREEN DOC REV: CPT | Performed by: INTERNAL MEDICINE

## 2019-03-26 RX ORDER — PROPAFENONE HYDROCHLORIDE 225 MG/1
225 CAPSULE, EXTENDED RELEASE ORAL 2 TIMES DAILY
Qty: 60 CAPSULE | Refills: 11 | Status: SHIPPED | OUTPATIENT
Start: 2019-03-26 | End: 2020-03-19 | Stop reason: SDUPTHER

## 2019-03-26 RX ORDER — PROPAFENONE HYDROCHLORIDE 225 MG/1
225 CAPSULE, EXTENDED RELEASE ORAL 2 TIMES DAILY
Qty: 60 CAPSULE | Refills: 11 | Status: SHIPPED | OUTPATIENT
Start: 2019-03-26 | End: 2019-03-26 | Stop reason: SDUPTHER

## 2019-06-18 ENCOUNTER — TELEPHONE (OUTPATIENT)
Dept: VASCULAR SURGERY | Age: 69
End: 2019-06-18

## 2019-06-18 DIAGNOSIS — I72.8 ANEURYSM OF SUBCLAVIAN ARTERY (HCC): Primary | ICD-10-CM

## 2019-06-18 NOTE — TELEPHONE ENCOUNTER
I spoke with Mrs. Kyle regarding her 's CT abdomen pelvis with and without contrast ordered by Dr. Juan Pablo Rolon for surveillance of his splenic artery aneurysm. The scan is scheduled at [facility] on Tuesday, June 25, 2019. He is to report at 7:00 am, scan at 8:00 am, NPO for four hours prior. I also scheduled the patient for a follow-up visit with Dr. Juan Pablo Rolon on Friday, July 12, 2019 at 8:45 am.  Mrs. Bruce Herrera wrote down the instructions and had no questions. They have our office telephone number.

## 2019-06-24 ENCOUNTER — TELEPHONE (OUTPATIENT)
Dept: VASCULAR SURGERY | Age: 69
End: 2019-06-24

## 2019-06-24 NOTE — TELEPHONE ENCOUNTER
A request from [employer] came through for a new order for CT scan that they thought .  Expiration date on order is 19

## 2019-06-25 ENCOUNTER — TELEPHONE (OUTPATIENT)
Dept: VASCULAR SURGERY | Age: 69
End: 2019-06-25

## 2019-06-25 ENCOUNTER — HOSPITAL ENCOUNTER (OUTPATIENT)
Dept: CT IMAGING | Age: 69
Discharge: HOME | End: 2019-06-25
Payer: COMMERCIAL

## 2019-06-25 DIAGNOSIS — I72.8 SPLENIC ARTERY ANEURYSM (HCC): ICD-10-CM

## 2019-06-25 DIAGNOSIS — I72.8 SPLENIC ARTERY ANEURYSM (HCC): Primary | ICD-10-CM

## 2019-07-09 ENCOUNTER — HOSPITAL ENCOUNTER (OUTPATIENT)
Dept: CT IMAGING | Age: 69
Discharge: HOME | End: 2019-07-09
Payer: COMMERCIAL

## 2019-07-09 DIAGNOSIS — I72.8 SPLENIC ARTERY ANEURYSM (HCC): ICD-10-CM

## 2019-07-09 PROCEDURE — 74176 CT ABD & PELVIS W/O CONTRAST: CPT

## 2019-07-12 ENCOUNTER — OFFICE VISIT (OUTPATIENT)
Dept: VASCULAR SURGERY | Age: 69
End: 2019-07-12
Payer: COMMERCIAL

## 2019-07-12 VITALS
BODY MASS INDEX: 25.16 KG/M2 | WEIGHT: 166 LBS | HEIGHT: 68 IN | SYSTOLIC BLOOD PRESSURE: 118 MMHG | DIASTOLIC BLOOD PRESSURE: 66 MMHG

## 2019-07-12 DIAGNOSIS — I72.8 SPLENIC ARTERY ANEURYSM (HCC): Primary | ICD-10-CM

## 2019-07-12 PROCEDURE — 4040F PNEUMOC VAC/ADMIN/RCVD: CPT | Performed by: SURGERY

## 2019-07-12 PROCEDURE — G8419 CALC BMI OUT NRM PARAM NOF/U: HCPCS | Performed by: SURGERY

## 2019-07-12 PROCEDURE — 1036F TOBACCO NON-USER: CPT | Performed by: SURGERY

## 2019-07-12 PROCEDURE — 3017F COLORECTAL CA SCREEN DOC REV: CPT | Performed by: SURGERY

## 2019-07-12 PROCEDURE — 99213 OFFICE O/P EST LOW 20 MIN: CPT | Performed by: SURGERY

## 2019-07-12 PROCEDURE — G8427 DOCREV CUR MEDS BY ELIG CLIN: HCPCS | Performed by: SURGERY

## 2019-07-12 PROCEDURE — 1123F ACP DISCUSS/DSCN MKR DOCD: CPT | Performed by: SURGERY

## 2019-07-12 NOTE — PROGRESS NOTES
organomegaly  Extremities: extremities normal, atraumatic, no cyanosis or edema    Pulses:   L brachial 2 R brachial 2   L radial 2 R radial 2   L femoral 2 R femoral 2   L popliteal 2 R popliteal 2   L posterior tibial 2 R posterior tibial 2   L dorsalis pedis 2 R dorsalis pedis 2   Doppler Signals:  +    Neurologic: Grossly normal    MEDICAL DECISION MAKING/TESTING  I have reviewed the testing personally and my interpretation is below. 1. Stable 2.2 cm splenic artery aneurysm.  Continued surveillance and/or   endovascular treatment recommended. 2. Nonobstructing bilateral nephrolithiasis. 3. Diverticulosis. Assessment:     Patient Active Problem List   Diagnosis    LUQ pain    Duodenal diverticulum    LGI bleed    Left wrist sprain/[employer]  DOI 1/12/16    Left elbow contusion [employer]  DOI 1/12/16    Closed nondisplaced fracture of triquetrum of left wrist [employer] DOI 1/12/16    Sprain of left elbow [employer]  DOI 1/12/16    Near syncope    GERD (gastroesophageal reflux disease)    BPH (benign prostatic hyperplasia)    HTN (hypertension)    Bradycardia    Palpitations    PAF (paroxysmal atrial fibrillation) ([facility] [address]    Renal stones       Plan:  1. Splenic artery aneurysm [facility])  70-year-old male with stable asymptomatic 2.2 cm splenic artery aneurysm. Recommend continued surveillance at this time. Repeat CT scan in 1 year. - CT ABDOMEN PELVIS WO CONTRAST Additional Contrast? None; Future        Thank you for allowing me to participate in the care of this individual.  Please do not hesitate to contact me with any questions. Marvin Flaherty M.D., FACS.  7/12/2019  8:49 AM

## 2020-02-19 ENCOUNTER — HOSPITAL ENCOUNTER (OUTPATIENT)
Dept: OCCUPATIONAL THERAPY | Age: 70
Setting detail: THERAPIES SERIES
Discharge: HOME | End: 2020-02-19
Payer: COMMERCIAL

## 2020-02-19 PROCEDURE — 97110 THERAPEUTIC EXERCISES: CPT

## 2020-02-19 PROCEDURE — 97165 OT EVAL LOW COMPLEX 30 MIN: CPT

## 2020-02-19 PROCEDURE — 97022 WHIRLPOOL THERAPY: CPT

## 2020-02-19 PROCEDURE — 97140 MANUAL THERAPY 1/> REGIONS: CPT

## 2020-02-19 NOTE — PROGRESS NOTES
[address]y 60, [address] [facility] [place], [address]  Phone: [phone]   Fax: [phone]                                                     Occupational Therapy Certification    Dear  , Dr. Annemarie Baer  We had the pleasure of evaluating the following patient for occupational therapy services at [facility]. A summary of our findings can be found in the initial assessment below. This includes our plan of care. If you have any questions or concerns regarding these findings, please do not hesitate to contact me at the office phone number checked above. Thank you for the referral.       Referring Practitioner Signature:_______________________________Date:__________________  By signing above (or electronic signature), therapists plan is approved by the referring practicioner      Patient: Shahla Narayan   : 1950   MRN: [id number]  Referring Practicioner:  Dr. Annemarie Baer     Evaluation Date: 2020      Medical Diagnosis Information: right wrist sprain                                              Insurance information:   worker's comp two times a week for 4 weeks    Precautions/ Contra-indications:  none  Latex Allergy:  [x]NO      []YES  Preferred Language for Healthcare:   [x]English       []other:  OCCUPATIONAL PROFILE  Reason for Seeking OT Services and Patient Goals:  Patient's goal is to increase strength, rom, decrease pain for work, patient is a  and has to be able to use tools.       Personal Interests and Values: likes working on cars, walking    Occupational History (Life Experiences Including Prior Level of Function):works full time, maintenance     Performance Patterns (Routines, Roles, Rituals, & Habits):works second shift    Physical and Social Environments (which aide or inhibit performance in desired occupations):wrist splint, light duty at work    Personal, Temporal, and Virtual Contexts (which aide or inhibit performance in desired occupations):pain is currently interfering with work vocational and avocation pursuits       SUBJECTIVE: Patient stated complaint mechanism of injury fall on out stretched hand on slippery  floor with out stretched hand  January 23  Pain Scale: 0/10  Patient is wearing wrist brace for support, wrist extension is painful, pain increases with  Easing factors: wrist brace has decreased pain so patient can work,  Took ibuprofen for pain relief initially  Provocative factors: wrist extension, any resistive use of tools, light duty at work     Type: []Constant   [x]Intermittent  []Radiating []Localized []other:       OBJECTIVE:   Hand dominance: right hand dominant    Inspection: edema at distal wrist crease 16 on left and 17.5 on right  21.5  Left 20    Palpation: fibrotic area dorsal forearm, radial side of forearm , noted clicking with pronation to supination    Bandages/Dressings/Incisions: none    ROM WFL: []Yes []No (if checked, see below)     PROM AROM    L R L R   Shoulder Flexion        Shoulder Abduction        Shoulder External Rotation        Shoulder Internal Rotation        Elbow Flexion        Elbow Extension        Pronation     wnl   Supination    Palpation, feel decreased smooth articulation of radius on ulna during motion 0-60, tight end rom patient feels pull ulnar aspect of forearm   Wrist Flexion     60   Wrist Extension     40 tight end rom, feels pull ulnar aspect of forearm   Radial Deviation     Wnl, pulling ulnar aspect of forearm end rom   Ulnar Deviation       CMC Abduction       5th Digit MCP Extension-Flexion       4th Digit MCP Extension-Flexion       3rd Digit MCP Extension-Flexion       2nd Digit MCP Extension-Flexion       1st Digit MCP Extension-Flexion       5th Digit PIP Extension- Flexion       4th Digit PIP Extension-Flexion       3rd Digit PIP Extension-Flexion       2nd Digit PIP Extension-Flexion 1st Digit IP Extension-Flexion       5th Digit DIP Extension-Flexion       4th Digit DIP Extension-Flexion       3rd Digit DIP Extension-Flexion       2nd Digit DIP Extension-Flexion       Composite Flexion (Tip of 3rd Digit to Distal Palmar Crease (cm))         Joint mobility:     [x]Normal    []Hypo   []Hyper    Strength WFL: []Yes []No (if checked, see below)    Strength (0-5) Left Right    Shoulder Flex  4   Shoulder Abd  4   Shoulder ER  4   Shoulder IR  4   Biceps  4   Triceps  4   Pronation   4-   Supination   4-   Wrist Flex  4-   Wrist Ext  4-   Wrist Radial Deviation         Orthopaedic Special Tests Positive  Negative  NT Comments    Shoulder        Empty Can              Elbow        Cozen's       Tinel's               Hand/Wrist       Finkelstein's       Tinel's       Phalen's       Froment's       Atkinson Sign       Boutoniere Deformity       Ravenna Neck Deformity       Heberden's Nodes (DIP)       Mick's Nodes (PIP)       Mallet Finger       Grind Test Zainab Diego)                      Hand Assessment Left  Right  Comments    9 Hole Peg Test (s)       Strength (lbs) 55 pounds 30 pounds  3 trials 2nd ring   Lateral Pinch Strength (lbs)      Palmar Pinch Strength (lbs)      Tip Pinch Strength (lbs)      Opposition (Y/N)  wnl    Functional Outcome Measures:                      QuickDASH Total Score: 22                   Functional Mobility/Transfers: independent     Posture:independent    Synergy/Muscle tone: wnl    Sensation: : wnl    Coordination: slight decrease in speed    Visual Acuity: bifocals    Perception: wnl    MVPT:  na    Tarlton making A:     Trail making B:     Visual field cut:    Cognition: wnl                  [x] Patient history, allergies, meds reviewed. Medical chart reviewed. See intake form. Review Of Systems (ROS):  [x]Performed Review of systems (Integumentary, CardioPulmonary, Neurological) by intake and observation. Intake form has been scanned into medical record.  Patient assessments, minimal to moderate modification of assessments, may have co-morbidities  [] high analytic complexity, comprehensive assessments, multiple treatment options, significant modifications of assessment, co-morbidities affecting performance  [x] Clinical decision making of [x] low , [] moderate, [] high complexity using standardized patient assessment instrument and/or measurable assessment of functional outcome. [x] EVAL (LOW) 70871 (typically 15 minutes face-to-face)  [] EVAL (MOD) 38502 (typically 30 minutes face-to-face)  [] EVAL (HIGH) 71629 (typically 45 minutes face-to-face)  [] RE-EVAL 48425    PLAN:  Frequency/Duration:2days per week for 6 Weeks:  INTERVENTIONS:  [x] Strength training, ROM, balance training, functional mobility training  [x] Neuromuscular re-education and positioning  [x] Manual therapy including soft tissue mobilization and joint manipululations  [x] Modalities as needed that may include: E-stim, Ultrasound, Fluidotherapy, Iontophoresis as indicated, Paraffin, Hot Packs, Cold Packs  [x] Patient/caregiver education on joint protection, postural re-education, activity modification, progression of HEP. [x] Patient/caregiver education regarding home management and safety as well as ADL and IADL performance  [x] Cognitive re-orientation and training  [x] Manual therapy including soft tissue mobilization, manual lymph drainage, and joint manipululations  [x] Adaptive Equipment Education and Training  [x] Splinting including custom or pre-fabricated    HEP instruction: Written and verbal HEP instructions provided and reviewed:   Patient instructed to wear compression sleeve over right forearm under splint at work, patient to wear compression glove on right hand at night. Patient instructed to complete short arch pronation and supination times 10 reps three times a day and wrist flexion / extension 10 reps three times a day out side of splint.   Patient to continue wearing splint at

## 2020-02-19 NOTE — PROGRESS NOTES
to co-morbidities.   [x] Plan just implemented, too soon to assess goals progression  [] All goals are met  [] Other:     ASSESSMENT:  See eval    Treatment/Activity Tolerance:  [x] Patient tolerated treatment well [] Patient limited by fatique  [] Patient limited by pain  [] Patient limited by other medical complications  [] Other:     Prognosis: [x] Good [] Fair  [] Poor    Patient Requires Follow-up: [x] Yes  [] No    PLAN: See eval  [] Continue per plan of care [] Alter current plan (see comments)  [x] Plan of care initiated [] Hold pending MD visit [] Discharge    Electronically signed by: Geovany Lawrence

## 2020-02-21 ENCOUNTER — HOSPITAL ENCOUNTER (OUTPATIENT)
Dept: OCCUPATIONAL THERAPY | Age: 70
Setting detail: THERAPIES SERIES
Discharge: HOME | End: 2020-02-21
Payer: COMMERCIAL

## 2020-02-21 PROCEDURE — 97110 THERAPEUTIC EXERCISES: CPT

## 2020-02-21 PROCEDURE — 97034 APP MDLTY 1+CNTRST BTH EA 15: CPT

## 2020-02-21 PROCEDURE — 97022 WHIRLPOOL THERAPY: CPT

## 2020-02-21 NOTE — PROGRESS NOTES
[facility] - Outpatient [facility]ational Therapy      Hand Therapy Daily Treatment Note  Date:  2020    Patient: Genaro Bowles   : 1950   MRN: [id number]  Referring Physician:  Dr. Kianna Malcolm Diagnosis Information: wrist sprain ,                                                  Insurance information:  kristin Palma is , phone is [phone]  Ext [phone] Fax is   Date of Injury: Patient stated complaint mechanism of injury fall on out stretched hand on slippery  floor with out stretched hand    Date of Surgery:na      Visit # Insurance Allowable Date Range   2/12 2/12 3/31     Date of Patient follow up with Physician:      Progress Note: []  Yes  [x]  No  Next due by: Visit #10      Latex Allergy:  [x]No      []Yes  Pacemaker:  [x] No       [] Yes     Preferred Language for Healthcare:   [x]English       []other:    Pain level:  0/10; 2 or 3/10    SUBJECTIVE:  See eval     Functional Disability Index: Quick DASH score -  22 2020    OBJECTIVE: See eval      RESTRICTIONS/PRECAUTIONS: none    Exercises/Interventions:    Initiated session with fluidotherapy for 10 minutes for soft tissue benefits. Pt then performed 2 variations of isolated tendon glides x 10 reps each. Pt then performed contrast bath for 11 minutes total (4 cycles of 2 minutes in the warm water and 3 cycles of 1 minutes in cold water). Pt then performed 2 variations of isolated tendon glides for 10 reps each with improved ability to make composite fist.    HEP instruction: Written and verbal HEP instructions provided and reviewed:   Patient instructed to wear compression sleeve over right forearm under splint at work, patient to wear compression glove on right hand at night.   Patient instructed to complete short arch pronation and supination times 10 reps three times a day and wrist flexion / extension 10 reps three times a day out side kinesthetic sense, posture, motor skill, proprioception of scapular, scapulothoracic and UE control with self care, reaching, carrying, lifting, house/yardwork, driving/computer work    [] Comments:    Manual Treatments:  PROM / STM / Oscillations-Mobs:  G-I, II, III, IV (PA's, Inf., Post.)  [x] (57233) Provided manual therapy to mobilize soft tissue/joints of cervical/CT, scapular GHJ and UE for the purpose of modulating pain, promoting relaxation,  increasing ROM, reducing/eliminating soft tissue swelling/inflammation/restriction, improving soft tissue extensibility and allowing for proper ROM for normal function with self care, reaching, carrying, lifting, house/yardwork, driving/computer work  [] Comments:    ADL Training:  [] (17432) Provided self-care/home management training related to activities of daily living and compensatory training, and/or use of adaptive equipment   [] Comments:     Splinting:  [] Fabrication of:   [] (59837) Orthotic/Prosthetic Management, subsequent encounter  [] (75530) Orthotic management and training (fitting and assessment)  [] Comments:    Modalities: 10 minutes fluidotherapy+11 minutes contrast bath    Charges:  Timed Code Treatment Minutes: 17   Total Treatment Minutes: 38     [] EVAL (LOW) 86102   [] OT Re-eval (38172)  [] EVAL (MOD) 74627   [] EVAL (HIGH) 14946       [x] Talya (81671) x 1    [] RDMAS(77995)  [] NMR (69778) x     [] Estim (attended) (85967)   [] Manual ([id number]) x     [] US (80204)  [] TA (63882) x     [] Paraffin (52705)  [] ADL  (86931) x    [] Splint/L code:    [] Estim (unattended) (22 [id number])  [x] Fluidotherapy (03100) x1  [x] Other: Contrast bath x1    GOALS:  Patient stated goal: decrease pain, increase rom and strength  []? Progressing: []? Met: []? Not Met: []? Adjusted     Therapist goals for Patient:   Short Term Goals: To be achieved in: 30 days  1. Pt will be pain free with use of tools at work  []? Progressing: []? Met: []? Not Met: []? Adjusted  2.  Pt

## 2020-02-24 ENCOUNTER — HOSPITAL ENCOUNTER (OUTPATIENT)
Dept: OCCUPATIONAL THERAPY | Age: 70
Setting detail: THERAPIES SERIES
Discharge: HOME | End: 2020-02-24
Payer: COMMERCIAL

## 2020-02-24 PROCEDURE — 97022 WHIRLPOOL THERAPY: CPT

## 2020-02-24 PROCEDURE — 97110 THERAPEUTIC EXERCISES: CPT

## 2020-02-24 NOTE — PROGRESS NOTES
[facility] Outpatient [facility]ational [facility]      Hand Therapy Daily Treatment Note  Date:  2020    Patient: Yong Ashby   : 1950   MRN: [id number]  Referring Physician:  Dr. Shazia Breaux Diagnosis Information: wrist sprain                                                   Insurance information:  kristin Galvez is , phone is [phone]  Ext [phone] Fax is   Date of Injury: Patient stated complaint mechanism of injury fall on out stretched hand on slippery  floor with out stretched hand    Date of Surgery:na      Visit # Insurance Allowable Date Range   3/12 3/12 3/31     Date of Patient follow up with Physician:      Progress Note: []  Yes  [x]  No  Next due by: Visit #10      Latex Allergy:  [x]No      []Yes  Pacemaker:  [x] No       [] Yes   RESTRICTIONS/PRECAUTIONS: none    Preferred Language for Healthcare:   [x]English       []other:    Pain level:  0/10 at rest     SUBJECTIVE:  Pt reported that he has worn compression glove and completed HEP over the weekend, pt stated he feels it is helping with pain, swelling, and movement. He has decreased pain and swelling overall. Functional Disability Index: Quick DASH score -  22 2020    OBJECTIVE:  Exercises/Interventions:    Initiated session with manual lymphatic drainage and fluidotherapy for 10 minutes for soft tissue benefits. Completed 10x short arc motion forearm pronation and supination. Completed 10x wrist extension. Short arc supination 10x. Flexor tendon glide 10x. In hand manipulation (palm to finger, finger to palm) with large golf tees and small beads. Demonstrated minimal difficulty with large golf tees, required minimal verbal cues for palm to finger translation. Pt demonstrated minimal to moderate difficulty with translation of small beads and required minimal verbal cues for palm to finger translation technique.  Completed

## 2020-02-28 ENCOUNTER — HOSPITAL ENCOUNTER (OUTPATIENT)
Dept: OCCUPATIONAL THERAPY | Age: 70
Setting detail: THERAPIES SERIES
Discharge: HOME | End: 2020-02-28
Payer: COMMERCIAL

## 2020-02-28 PROCEDURE — 97140 MANUAL THERAPY 1/> REGIONS: CPT

## 2020-02-28 PROCEDURE — 97110 THERAPEUTIC EXERCISES: CPT

## 2020-02-28 PROCEDURE — 97530 THERAPEUTIC ACTIVITIES: CPT

## 2020-02-28 PROCEDURE — 97022 WHIRLPOOL THERAPY: CPT

## 2020-02-28 NOTE — PROGRESS NOTES
[facility] - Outpatient [facility]ational Therapy      Hand Therapy Daily Treatment Note  Date:  2020    Patient: Elinor Weems   : 1950   MRN: [id number]  Referring Physician:  Dr. Britany Bolton Diagnosis Information: wrist sprain    Treatment diagnosis: R wrist pain creating difficulty completing essential job tasks   Insurance information:  kristin Clemons is , phone is [phone]  Ext [phone] Fax is   Date of Injury: Patient stated complaint mechanism of injury fall on out stretched hand on slippery  floor with out stretched hand    Date of Surgery:na      Visit # Insurance Allowable Date Range   4/12 4/12 3/31     Date of Patient follow up with Physician:      Progress Note: []  Yes  [x]  No  Next due by: Visit #10      Latex Allergy:  [x]No      []Yes  Pacemaker:  [x] No       [] Yes   RESTRICTIONS/PRECAUTIONS: none    Preferred Language for Healthcare:   [x]English       []other:    Pain level:  0/10 at rest; 3/10 with movement    SUBJECTIVE:  Pt reported that his wrist/hand only hurts if he moves it a certain way. Functional Disability Index: Quick DASH score -  22 2020    OBJECTIVE:  Exercises/Interventions:     Initiated session with 10 minutes of fluidotherapy for soft tissue benefits. Soft tissue mobilization performed by way of manual input and hawk  to mitigate fibrotic nodules in the flexor and extensor  regions of the forearm in order to reduce pain and increase soft tissue length with press and move technique into wrist flexion and extension x 3 sets of 10 repetitions.   Pt then performed the following exercises:  Seated Wrist Extension with Towel Twist - 20 reps - 1 set  Seated Wrist Flexion with Towel Twist - 20 reps - 1 sets   Wrist Prayer Stretch at Table - 10 reps - 1 sets  - 1x daily    Seated Wrist Flexion Stretch - 10 reps - 1 set  Remainder of session focused on discussion house/yardwork, driving/computer work.    [] (54530) Provided verbal/tactile cueing for activities related to improving balance, coordination, kinesthetic sense, posture, motor skill, proprioception  to assist with  scapular, scapulothoracic and UE control with self care, reaching, carrying, lifting, house/yardwork, driving/computer work.   [] Comments:    Therapeutic Activities:    [x] (84952 or 06004) Provided verbal/tactile cueing for activities related to improving balance, coordination, kinesthetic sense, posture, motor skill, proprioception and motor activation to allow for proper function of scapular, scapulothoracic and UE control with self care, carrying, lifting, driving/computer work  [] Comments:    Home Exercise Program:    [x] (99169) Reviewed/Progressed HEP activities related to strengthening, flexibility, endurance, ROM of scapular, scapulothoracic and UE control with self care, reaching, carrying, lifting, house/yardwork, driving/computer work  [] (21848) Reviewed/Progressed HEP activities related to improving balance, coordination, kinesthetic sense, posture, motor skill, proprioception of scapular, scapulothoracic and UE control with self care, reaching, carrying, lifting, house/yardwork, driving/computer work    [] Comments:    Manual Treatments:  PROM / STM / Oscillations-Mobs:  G-I, II, III, IV (PA's, Inf., Post.)  [x] (25418) Provided manual therapy to mobilize soft tissue/joints of cervical/CT, scapular GHJ and UE for the purpose of modulating pain, promoting relaxation,  increasing ROM, reducing/eliminating soft tissue swelling/inflammation/restriction, improving soft tissue extensibility and allowing for proper ROM for normal function with self care, reaching, carrying, lifting, house/yardwork, driving/computer work  [] Comments:    ADL Training:  [] (45050) Provided self-care/home management training related to activities of daily living and compensatory training, and/or use of adaptive Poor    Patient Requires Follow-up: [x] Yes  [] No    PLAN: See eval  [x] Continue per plan of care [] Alter current plan (see comments)  [] Plan of care initiated [] Hold pending MD visit [] Discharge      Electronically signed by: CROW Caban, OTR/L

## 2020-03-04 ENCOUNTER — HOSPITAL ENCOUNTER (OUTPATIENT)
Dept: OCCUPATIONAL THERAPY | Age: 70
Setting detail: THERAPIES SERIES
Discharge: HOME | End: 2020-03-04
Payer: COMMERCIAL

## 2020-03-04 PROCEDURE — 97140 MANUAL THERAPY 1/> REGIONS: CPT

## 2020-03-04 PROCEDURE — 97022 WHIRLPOOL THERAPY: CPT

## 2020-03-04 PROCEDURE — 97110 THERAPEUTIC EXERCISES: CPT

## 2020-03-04 NOTE — PROGRESS NOTES
Neuromuscular Re-ed / Therapeutic Activities                                                 Manual Intervention                                                     Therapeutic Exercise and NMR:  [x] (28388) Provided verbal/tactile cueing for activities related to strengthening, flexibility, endurance, ROM  for improvements in scapular, scapulothoracic and UE control with self care, reaching, carrying, lifting, house/yardwork, driving/computer work.    [] (97665) Provided verbal/tactile cueing for activities related to improving balance, coordination, kinesthetic sense, posture, motor skill, proprioception  to assist with  scapular, scapulothoracic and UE control with self care, reaching, carrying, lifting, house/yardwork, driving/computer work.   [] Comments:    Therapeutic Activities:    [] (37697 or 14414) Provided verbal/tactile cueing for activities related to improving balance, coordination, kinesthetic sense, posture, motor skill, proprioception and motor activation to allow for proper function of scapular, scapulothoracic and UE control with self care, carrying, lifting, driving/computer work  [] Comments:    Home Exercise Program:    [x] (25994) Reviewed/Progressed HEP activities related to strengthening, flexibility, endurance, ROM of scapular, scapulothoracic and UE control with self care, reaching, carrying, lifting, house/yardwork, driving/computer work  [] (11615) Reviewed/Progressed HEP activities related to improving balance, coordination, kinesthetic sense, posture, motor skill, proprioception of scapular, scapulothoracic and UE control with self care, reaching, carrying, lifting, house/yardwork, driving/computer work    [] Comments:    Manual Treatments:  PROM / STM / Oscillations-Mobs:  G-I, II, III, IV (PA's, Inf., Post.)  [x] (61659) Provided manual therapy to mobilize soft tissue/joints of cervical/CT, scapular GHJ and UE

## 2020-03-06 ENCOUNTER — HOSPITAL ENCOUNTER (OUTPATIENT)
Dept: OCCUPATIONAL THERAPY | Age: 70
Setting detail: THERAPIES SERIES
Discharge: HOME | End: 2020-03-06
Payer: COMMERCIAL

## 2020-03-06 PROCEDURE — 97140 MANUAL THERAPY 1/> REGIONS: CPT

## 2020-03-06 PROCEDURE — 97022 WHIRLPOOL THERAPY: CPT

## 2020-03-06 NOTE — PROGRESS NOTES
fluidotherapy    Charges:  Timed Code Treatment Minutes: 15   Total Treatment Minutes: 25     [] EVAL (LOW) 87822   [] OT Re-eval (33829)  [] EVAL (MOD) 02601   [] EVAL (HIGH) 51294       [] Talya (10999) x  [] MLKGX(30655)  [] NMR (97294) x     [] Estim (attended) (97146)   [x] Manual ([id number]) x    1 [] US (83375)  [] TA (04846) x     [] Paraffin (98496)  [] ADL  (37894) x    [] Splint/L code:    [] Estim (unattended) (22 921913) [] Fluidotherapy (37206) x1  [] Other:    GOALS:  Patient stated goal: decrease pain, increase rom and strength  [x]? Progressing: []? Met: []? Not Met: []? Adjusted     Therapist goals for Patient:   Short Term Goals: To be achieved in: 30 days  1. Pt will be pain free with use of tools at work  [x]? Progressing: []? Met: []? Not Met: []? Adjusted  2. Pt  Quick dash score will decrease to 10 from 22  [x]? Progressing: []? Met: []? Not Met: []? Adjusted     Long Term Goals: To be achieved by jorge  1. Pt will will  Have increased strength to 60 pounds of  on right and be pain free during 8 hour shift without use of splint  [x]? Progressing: []? Met: []? Not Met: []? Adjusted    Progression Towards Functional goals:  [x] Patient is progressing as expected towards functional goals listed. [] Progression is slowed due to complexities listed. [] Progression has been slowed due to co-morbidities.   [] Plan just implemented, too soon to assess goals progression  [] All goals are met  [] Other:     ASSESSMENT:  See eval    Treatment/Activity Tolerance:  [x] Patient tolerated treatment well [] Patient limited by fatique  [] Patient limited by pain  [] Patient limited by other medical complications  [] Other:     Prognosis: [x] Good [] Fair  [] Poor    Patient Requires Follow-up: [x] Yes  [] No    PLAN: See eval  [x] Continue per plan of care [] Alter current plan (see comments)  [] Plan of care initiated [] Hold pending MD visit [] Discharge      Electronically signed by: Gisela Paz, OTR/L

## 2020-03-09 ENCOUNTER — HOSPITAL ENCOUNTER (OUTPATIENT)
Dept: OCCUPATIONAL THERAPY | Age: 70
Setting detail: THERAPIES SERIES
Discharge: HOME | End: 2020-03-09
Payer: COMMERCIAL

## 2020-03-09 PROCEDURE — 97110 THERAPEUTIC EXERCISES: CPT

## 2020-03-09 PROCEDURE — 97035 APP MDLTY 1+ULTRASOUND EA 15: CPT

## 2020-03-13 ENCOUNTER — HOSPITAL ENCOUNTER (OUTPATIENT)
Dept: OCCUPATIONAL THERAPY | Age: 70
Setting detail: THERAPIES SERIES
Discharge: HOME | End: 2020-03-13
Payer: COMMERCIAL

## 2020-03-13 PROCEDURE — 97018 PARAFFIN BATH THERAPY: CPT

## 2020-03-13 PROCEDURE — 97140 MANUAL THERAPY 1/> REGIONS: CPT

## 2020-03-13 PROCEDURE — 97530 THERAPEUTIC ACTIVITIES: CPT

## 2020-03-13 NOTE — PROGRESS NOTES
Negative  NT Comments    Shoulder      x      Empty Can     x            x      Elbow      x      Cozen's     x      Tinel's      x            x      Hand/Wrist     x      Finkelstein's   x        Tinel's     x      Phalen's     x      Froment's     x      Cascade Sign     x      [place] Deformity     x      [place] Neck Deformity     x      Heberden's Nodes (DIP)     x      Mick's Nodes (PIP)     x      Mallet Finger     x      Grind Test Zainab Diego)    x                      Hand Assessment Left  Right  Comments    9 Hole Peg Test (s)     Not measured due to lifting restriction     Strength (lbs)      Lateral Pinch Strength (lbs)         Palmar Pinch Strength (lbs)         Tip Pinch Strength (lbs)         Opposition (Y/N)   wnl       Edema:   · 21.2cm around distal wrist crease  · 10.75cm around MCP joints of R hand      Exercises/Interventions:   Initiated session with thorough reassessment of pt symptoms including provacative testing and discussion of ability to complete various tasks at work and the environment at the patient's work as well as instruction regarding what to do regarding culture associated with restrictions. Pt then immersed within paraffin bath for 10 minutes for soft tissue benefits. Soft tissue mobilization performed by way of manual input to mitigate fibrotic nodules in the volar portion of the hand in order to reduce pain and increase soft tissue length. Manual lymph drainage then performed to the pt's RUE. Pt then completed x ~5 repetitions of AROM composite fist and supination of the forearm. Pt with noted clicking of the distal wrist gentle joint mobilization then performed to the radiocarpal joint x 30 repetitions. Distoradioulnar joint mobilizations then performed. At the end of the session progress towards goals, objective measurements, and plan of care was discussed.       HEP instruction: Written and verbal HEP instructions provided and reviewed:   Patient instructed to wear compression sleeve over right forearm under splint at work, patient to wear compression glove on right hand at night. Patient instructed to complete short arch pronation and supination times 10 reps three times a day and wrist flexion / extension 10 reps three times a day out side of splint. Patient to continue wearing splint at work. MLD completed prior to 12 minutes in fluidotherapy for soft tissue benefits. Reviewed short arc motions as perscribed with therapist providing gentle mobilization of radius on ulna to increase supination. · Contrast bath 2-3x daily  · Isolated tendon glides 2/28  · Isometric grasp of washcloth 3/9      Access Code: [email or web address]   URL: [email or web address]   Date: 02/28/2020   Exercises   Seated Wrist Extension with Towel Twist - 20 reps - 3 sets - 1x daily - 7x weekly   Seated Wrist Flexion with Towel Twist - 20 reps - 3 sets - 1x daily - 7x weekly   Wrist Prayer Stretch at Table - 10 reps - 3 sets - 10 seconds hold - 1x daily - 7x weekly   Seated Wrist Flexion Stretch - 10 reps - 3 sets - 1x daily - 7x weekly   3/4 paper crumbles and in hand translation of objects.      Therapeutic Exercises  Resistance / level Sets/sec Reps Notes                                                                                Neuromuscular Re-ed / Therapeutic Activities                                                 Manual Intervention                                                     Therapeutic Exercise and NMR:  [] (67234) Provided verbal/tactile cueing for activities related to strengthening, flexibility, endurance, ROM  for improvements in scapular, scapulothoracic and UE control with self care, reaching, carrying, lifting, house/yardwork, driving/computer work.    [] (40087) Provided verbal/tactile cueing for activities related to improving balance, coordination, kinesthetic sense, posture, motor skill, proprioception  to assist with  scapular, scapulothoracic and UE control with Code Treatment Minutes: 50   Total Treatment Minutes: 60     [] EVAL (LOW) 17874   [] OT Re-eval (31191)  [] EVAL (MOD) 63344   [] EVAL (HIGH) 91888       [] Talya (95298) x  [] ICKXY(61403)  [] NMR (92175) x     [] Estim (attended) (10113)   [x] Manual (66660) x    1 [] US (92223)    [x] TA (58157) x 2     [x] Paraffin (09276) x 1  [] ADL  (46225) x    [] Splint/L code:    [] Estim (unattended) (07948) [] Fluidotherapy (22434) x1  [] Other:    GOALS:  Patient stated goal: decrease pain, increase rom and strength  [x]? Progressing: []? Met: []? Not Met: []? Adjusted     Therapist goals for Patient:   Short Term Goals: To be achieved in: 30 days  1. Pt will be pain free with use of tools at work  [x]? Progressing: []? Met: [x]? Not Met: []? Adjusted  2. Pt  Quick dash score will decrease to 11 from 22  [x]? Progressing: []? Met: []? Not Met: [x]? Adjusted     Long Term Goals: To be achieved by jorge  1. Pt will will  Have increased strength to 60 pounds of  on right and be pain free during 8 hour shift without use of splint  []? Progressing: []? Met: []? Not Met: []? Adjusted    X Not measured due to pt lifting restrictions     Progression Towards Functional goals:  [x] Patient is progressing as expected towards functional goals listed. [] Progression is slowed due to complexities listed. [] Progression has been slowed due to co-morbidities. [] Plan just implemented, too soon to assess goals progression  [] All goals are met  [] Other:     ASSESSMENT:  Pt continues to progress with available ROM, however, pt complains of tightness at end ROM of the R hand when performing composite digital.  Pt also with symptoms resembling those of trigger finger in the 4th digit of the RUE which was newly noticed today and pt describes as occasionally getting stuck and \"popping\" back open. Pt also complains of numbness and tingling along the 4th and 5th digit when he rests his hand on something for too long.   Pt may be slow to heal due to pt continuing to \"wrench\" and perform other related tasks at work. Pt stated that his superiors at work appear to becoming less tolerant of light duty restrictions insisted upon by the doctor. As of 2/29/20, pt also has a 10lb lifting restriction, yet his job requires consistent lifting with the RUE and pt was educated that he is likely exerting greater than 10lbs of force when wrenching and that he should not do this. Pt stated that there is not much he could do at his job if he was not able to wrench. Pt was instructed to contact his doctor regarding this dilemma as well as concerns regarding workplace culture and the accommodation of his restrictions.       Treatment/Activity Tolerance:  [x] Patient tolerated treatment well [] Patient limited by fatique  [] Patient limited by pain  [] Patient limited by other medical complications  [] Other:     Prognosis: [x] Good [] Fair  [] Poor    Patient Requires Follow-up: [x] Yes  [] No    PLAN: In addition to continuing with 4 more visits allowed, requesting 10 more additional visits with date extension to 5/5/20  [x] Continue per plan of care [x] Alter current plan (see comments)  [] Plan of care initiated [] Hold pending MD visit [] Discharge     [facility] Time Log For Billed Codes  Date: 3/13/20  Clock Time Code   10:00-10:15 1 TA   10:15-10:25 1 Paraffin   10:25- 10:45 1 Manual   10:45-11:00 1 TA       Electronically signed by: CROW Maciel, OTR/L

## 2020-03-16 ENCOUNTER — HOSPITAL ENCOUNTER (OUTPATIENT)
Dept: OCCUPATIONAL THERAPY | Age: 70
Setting detail: THERAPIES SERIES
End: 2020-03-16
Payer: COMMERCIAL

## 2020-03-19 ENCOUNTER — TELEPHONE (OUTPATIENT)
Dept: CARDIOLOGY CLINIC | Age: 70
End: 2020-03-19

## 2020-03-19 RX ORDER — PROPAFENONE HYDROCHLORIDE 225 MG/1
225 CAPSULE, EXTENDED RELEASE ORAL 2 TIMES DAILY
Qty: 180 CAPSULE | Refills: 1 | Status: SHIPPED | OUTPATIENT
Start: 2020-03-19 | End: 2020-09-24 | Stop reason: SDUPTHER

## 2020-03-19 NOTE — TELEPHONE ENCOUNTER
Pt does not want to go to [facility]. Pt would like an appt with [facility] or MARY in [place]. Pt has onbly a couple m ore weeks of medication left. Please call to advise when/where.

## 2020-07-13 ENCOUNTER — HOSPITAL ENCOUNTER (OUTPATIENT)
Dept: CT IMAGING | Age: 70
Discharge: HOME | End: 2020-07-13
Payer: COMMERCIAL

## 2020-07-13 PROCEDURE — 74176 CT ABD & PELVIS W/O CONTRAST: CPT

## 2020-07-15 ENCOUNTER — TELEPHONE (OUTPATIENT)
Dept: VASCULAR SURGERY | Age: 70
End: 2020-07-15

## 2020-07-15 NOTE — TELEPHONE ENCOUNTER
Peer to peer completed with Dr. Carlos A Ambrose regarding CT of the abdomen and pelvis WO contrast which was approved. Approval will be faxed to our office.     Electronically signed by SATYA Concepcion CNP on 7/15/2020 at 10:08 AM

## 2020-07-22 ENCOUNTER — OFFICE VISIT (OUTPATIENT)
Dept: VASCULAR SURGERY | Age: 70
End: 2020-07-22
Payer: COMMERCIAL

## 2020-07-22 VITALS
WEIGHT: 169 LBS | DIASTOLIC BLOOD PRESSURE: 70 MMHG | SYSTOLIC BLOOD PRESSURE: 128 MMHG | HEIGHT: 69 IN | TEMPERATURE: 97.9 F | BODY MASS INDEX: 25.03 KG/M2

## 2020-07-22 PROCEDURE — 99213 OFFICE O/P EST LOW 20 MIN: CPT | Performed by: SURGERY

## 2020-07-22 NOTE — PROGRESS NOTES
[facility])   Vascular Surgery Followup    Referring Provider:  Kellee Hylton MD     Chief Complaint   Patient presents with    Follow-up        History of Present Illness:   71-year-old male here today for routine follow-up of a splenic artery aneurysm. Underwent CT scan imaging prior to this visit and is here today to discuss the results. He denies abdominal or back pain. He will occasionally get some cramping that he thinks is related to residual kidney dysfunction. Past Medical History:   has a past medical history of Atrial fibrillation ([address] Chronic kidney disease, Diverticulitis, GERD (gastroesophageal reflux disease), Kidney stones, Snores, and Splenic artery aneurysm ([address] Surgical History:   has a past surgical history that includes back surgery; Lithotripsy; Cholecystectomy, laparoscopic (12-27-10); Colonoscopy (10/28/14); Cystocopy (06/29/2018); and Cystoscopy (07/31/2018). Social History:   reports that he has never smoked. He has never used smokeless tobacco. He reports that he does not drink alcohol or use drugs. Family History:  family history includes Cancer in his brother, brother, and mother; Heart Disease in his brother, father, and sister.      Home Medications:  Current Outpatient Medications   Medication Sig Dispense Refill    propafenone (RYTHMOL SR) 225 MG extended release capsule Take 1 capsule by mouth 2 times daily 180 capsule 1    metoprolol succinate (TOPROL XL) 25 MG extended release tablet Take 0.5 tablets by mouth daily as needed (as needed) 90 tablet 3    sennosides-docusate sodium (SENOKOT-S) 8.6-50 MG tablet Take 1 tablet by mouth daily 30 tablet 0    MULTI-DIGESTIVE ENZYMES PO Take 1 capsule by mouth 3 times daily as needed      magnesium lactate (MAG-TAB CR) 84 MG (7MEQ) TBCR extended release tablet Take 210 mg by mouth daily      aspirin 81 MG tablet Take 81 mg by mouth daily \"takes occasionally\"      Multiple Vitamins-Minerals (MULTIVITAMIN ADULT PO) Take 1 tablet by mouth daily      Omega-3 Fatty Acids (OMEGA 3 PO) Take 1 tablet by mouth daily       No current facility-administered medications for this visit. Allergies:  Phenergan [promethazine]     Review of Systems:   · Constitutional: there has been no unanticipated weight loss. There's been no change in energy level, sleep pattern, or activity level. · Eyes: No visual changes or diplopia. No scleral icterus. · ENT: No Headaches, hearing loss or vertigo. No mouth sores or sore throat. · Cardiovascular: Reviewed in HPI  · Respiratory: No cough or wheezing, no sputum production. No hematemesis. · Gastrointestinal: No abdominal pain, appetite loss, blood in stools. No change in bowel or bladder habits. · Genitourinary: No dysuria, trouble voiding, or hematuria. · Musculoskeletal:  No gait disturbance, weakness or joint complaints. · Integumentary: No rash or pruritis. · Neurological: No headache, diplopia, change in muscle strength, numbness or tingling. No change in gait, balance, coordination, mood, affect, memory, mentation, behavior. · Psychiatric: No anxiety, no depression. · Endocrine: No malaise, fatigue or temperature intolerance. No excessive thirst, fluid intake, or urination. No tremor. · Hematologic/Lymphatic: No abnormal bruising or bleeding, blood clots or swollen lymph nodes. · Allergic/Immunologic: No nasal congestion or hives. Physical Examination:    Vitals:    07/22/20 1129   BP: 128/70   Temp: 97.9 °F (36.6 °C)          General appearance: alert, appears stated age, cooperative and no distress  Neck: no adenopathy, no carotid bruit, no JVD, supple, symmetrical, trachea midline and thyroid: not enlarged, symmetric, no tenderness/mass/nodules  Lungs: clear to auscultation bilaterally  Heart: regular rate and rhythm  Abdomen: soft, non-tender.  Bowel sounds normal. No masses,  no organomegaly  Extremities: extremities normal, atraumatic, no cyanosis or

## 2020-08-12 NOTE — PROGRESS NOTES
mouth daily Yes Vishal Soto MD   MULTI-DIGESTIVE ENZYMES PO Take 1 capsule by mouth 3 times daily as needed Yes Historical Provider, MD   magnesium lactate (MAG-TAB CR) 84 MG (7MEQ) TBCR extended release tablet Take 210 mg by mouth daily Yes Historical Provider, MD   aspirin 81 MG tablet Take 81 mg by mouth daily \"takes occasionally\"  Historical Provider, MD   Multiple Vitamins-Minerals (MULTIVITAMIN ADULT PO) Take 1 tablet by mouth daily  Historical Provider, MD   Omega-3 Fatty Acids (OMEGA 3 PO) Take 1 tablet by mouth daily  Historical Provider, MD      Past Medical History:  Past Medical History:   Diagnosis Date    Atrial fibrillation ([facility] [address]     PAROXYSMAL    Chronic kidney disease     Diverticulitis     GERD (gastroesophageal reflux disease)     Kidney stones     Snores     Splenic artery aneurysm ([facility])     2.2 CM AS OF 4-2018     Past Surgical History:    has a past surgical history that includes back surgery; Lithotripsy; Cholecystectomy, laparoscopic (12-27-10); Colonoscopy (10/28/14); Cystocopy (06/29/2018); and Cystoscopy (07/31/2018). Social History:  Reviewed. reports that he has never smoked. He has never used smokeless tobacco. He reports that he does not drink alcohol or use drugs. Family History:  Reviewed. family history includes Cancer in his brother, brother, and mother; Heart Disease in his brother, father, and sister. Denies family history of sudden cardiac death, arrhythmia, premature CAD    Review of System:  · Constitutional: No fevers, chills, weight changes or weakness  · HEENT: No visual changes. No mouth sores or sore throat. · Cardiovascular: denies chest pain, denies dyspnea on exertion, admits to palpitations or denies loss of consciousness. No cough, hemoptysis, denies pleuritic pain, or phlebitis. · Respiratory: denies cough or wheezing. No hematemesis. · Gastrointestinal: No abdominal pain, blood in stools.    · Genitourinary: No dysuria, urgency or hematuria. · Musculoskeletal: denies gait disturbance, No muscle weakness. · Integumentary: No rash or pruritis. · Neurological: No headache, change in muscle strength, numbness or tingling. · Psychiatric: No confusion, anxiety, or depression. · Endocrine: No temperature intolerance. No excessive thirst, fluid intake, or urination. · Hem/Lymph: No abnormal bruising or bleeding, blood clots or swollen lymph nodes. Physical Examination:  There were no vitals filed for this visit. Wt Readings from Last 3 Encounters:   07/22/20 169 lb (76.7 kg)   07/12/19 166 lb (75.3 kg)   03/26/19 163 lb 12.8 oz (74.3 kg)     Constitutional: Cooperative and in no apparent distress, and appears well nourished  Skin: Warm and pink; no pallor, cyanosis, bruising, or clubbing  HEENT: Symmetric and normocephalic. PERRL, EOM intact. Conjunctiva pink with clear sclera. Mucus membranes pink and moist. Teeth intact. Thyroid smooth without nodules or goiter  Respiratory: Respirations symmetric and unlabored. Lungs clear to auscultation bilaterally, no wheezing, rhonchi, or crackles  Cardiovascular:  regular rate and rhythm. S1 & S2 present without murmurs, rubs, or gallops. Peripheral pulses 2+, capillary refill < 3 seconds. negative elevation of JVP. No peripheral edema  Gastrointestinal: Abdomen soft and round. Bowel sounds normoactive in all quadrants without tenderness or masses. Musculoskeletal: Bilateral upper and lower extremity strength 5/5 with full ROM. Neurological/Psych: Awake and orientated to person, place and time. Calm affect, appropriate mood.      Pertinent labs, diagnostic, device, and imaging results reviewed as a part of this visit    LABS    CBC:   Lab Results   Component Value Date    WBC 9.5 07/31/2018    HGB 16.1 07/31/2018    HCT 46.6 07/31/2018    MCV 88.5 07/31/2018     07/31/2018     BMP:   Lab Results   Component Value Date    CREATININE 0.9 07/31/2018    BUN 8 07/31/2018     07/31/2018 K 4.2 2018     2018    CO2 24 2018     CrCl cannot be calculated (Patient's most recent lab result is older than the maximum 120 days allowed. ). No results found for: BNP    Thyroid:   Lab Results   Component Value Date    TSH 2.08 2010     Lipid Panel:   Lab Results   Component Value Date    CHOL 165 2010    HDL 30 2010    TRIG 131 2010     LFTs:  Lab Results   Component Value Date    ALT 27 2017    AST 22 2017    ALKPHOS 58 2017    BILITOT 0.5 2017     Coags:   Lab Results   Component Value Date    PROTIME 11.7 2017    INR 1.04 2017    APTT 28.1 2017     EC2020 SB HR 54, , , QTc 452    Echo: 3/27/2017  Summary   -Normal left ventricle size and systolic function with an estimated ejection   fraction of 60-65%. -No regional wall motion abnormalities are seen. -Borderline concentric left ventricular hypertrophy is present.   -Normal diastolic function. E/e'= 7.2 .   -There is trivial mitral and tricuspid regurgitation with RVSP estimated at   26 mmHg. Assessment:  Paroxysmal Atrial Fibrillation  - ECG today shows SB  - On propafenone 225 mg bid and Toprol 12.5 mg daily prn  - palpitations 2-3 times daily   - [id number] score: 2 (age, HTN); [id number] Vasc score and anticoagulation discussed. High risk for stroke and thromboembolism. Anticoagulation is recommended.   ~ On ASA 81 mg, however he has stopped taking this  ~ Discussed anticoagulation to decrease the risk of thromboembolic events including stroke. Benefits and alternatives were discussed with patient. Risk of bleeding was discussed. Patient verbalized understanding. Different forms of anticoagulants including coumadin, Pradaxa, Eliquis, and Xarelto were discussed.    ~ Discussed risk of stroke 2.2% yearly, however he does not want to proceed with blood thinner as recommended  - Afib risk factors including age, HTN, obesity, inactivity and SHAISTA were discussed with patient. Risk factor modification recommended   ~ TSH 2.08 (3/22/2010)     - Treatment options including cardioversion, rate control strategy, antiarrhythmics, anticoagulation and possible ablation were discussed with patient. Risks, benefits and alternative of each treatment options were explained. All questions answered  SHAISTA   - Has been referred in the past and declined  HTN-goal <130/80   - Uncontrolled   - Discussed medication management and he declined, discussed risks of untreated HTN and risk factor modification   - Encouraged patient to check BP at home, log and bring to office visits  - Discussed lifestyle modifications, weight loss, low sodium diet  Plan  - Event monitor  - Echocardiogram   - Discussed starting amlodipine, however he wants to try reducing salt intake first    F/U: Follow-up with EP in 2-3 months NPAL  -Follow up with device clinic as scheduled  -Call [address] at [phone] with any questions    Diet & Exercise:   The patient is counseled to follow a low salt diet to assure blood pressure remains controlled for cardiovascular risk factor modification   The patient is counseled to avoid excess caffeine, and energy drinks as this may exacerbated ectopy and arrhythmia   The patient is counseled to lose weight to control cardiovascular risk factors   Exercise program discussed: To improve overall cardiovascular health, the patient is instructed to increase cardiovascular related activities with a goal of 150 min/week of moderate level activity or 10,000 steps per day. Encouraged to perform as much activity as tolerated    Quality Metrics  1. Tobacco Cessation Counseling: Nonsmoker, N/A   2. Retake of BP if >140/90: yes  3. Documentation to PCP: Note sent to PCP office visit  4. CAD patient on anti-platelet: He has stopped and declined   5. CAD patient on STATIN therapy: N/A  6.    Patient with history of CHF and atrial fibrillation on anticoagulation: He declined AC, risks of stroke discussed     I have addressed the patient's cardiac risk factors and adjusted pharmacologic treatment as needed. In addition, I have reinforced the need for patient directed risk factor modification. I independently reviewed the ECG    All questions and concerns were addressed with the patient. Alternatives to treatment were discussed. Thank you for allowing to us to participate in the care of Helen Cordova.     SATYA Reyes-CNP  [facility]   Office: [phone]

## 2020-08-13 ENCOUNTER — OFFICE VISIT (OUTPATIENT)
Dept: CARDIOLOGY CLINIC | Age: 70
End: 2020-08-13
Payer: COMMERCIAL

## 2020-08-13 VITALS
HEART RATE: 54 BPM | BODY MASS INDEX: 24.59 KG/M2 | SYSTOLIC BLOOD PRESSURE: 142 MMHG | HEIGHT: 69 IN | WEIGHT: 166 LBS | DIASTOLIC BLOOD PRESSURE: 88 MMHG

## 2020-08-13 PROCEDURE — 93228 REMOTE 30 DAY ECG REV/REPORT: CPT | Performed by: INTERNAL MEDICINE

## 2020-08-13 PROCEDURE — 93000 ELECTROCARDIOGRAM COMPLETE: CPT | Performed by: NURSE PRACTITIONER

## 2020-08-13 PROCEDURE — 99214 OFFICE O/P EST MOD 30 MIN: CPT | Performed by: NURSE PRACTITIONER

## 2020-08-13 NOTE — PATIENT INSTRUCTIONS
- Event monitor for 30 days   - Monitor bleed pressure at home  - Check your blood pressure at home, if your top number blood pressure is >140 or <90 please call the office  - Check your heart rate at home, if it is <50 or >110 please call the office   - Follow up in 3 months and establish with Dr. Katelyn Mack
no

## 2020-08-18 ENCOUNTER — TELEPHONE (OUTPATIENT)
Dept: CARDIOLOGY CLINIC | Age: 70
End: 2020-08-18

## 2020-08-18 NOTE — TELEPHONE ENCOUNTER
Wife calling asking to speak to NPAL. Needs to speak to her about this patients b/p . At bedtime last night b/p was 113/77 but he had palpitations all night and didn't sleep much.

## 2020-08-18 NOTE — TELEPHONE ENCOUNTER
Call placed to Deo Mcmanus regarding Jhoan Zambrano vitals. Discussed his vitals and printed his monitor results so far. Will review with NPAL.  Advised to keep track of his blood pressure and symptoms and call back if concerning

## 2020-08-21 ENCOUNTER — TELEPHONE (OUTPATIENT)
Dept: CARDIOLOGY CLINIC | Age: 70
End: 2020-08-21

## 2020-08-21 NOTE — TELEPHONE ENCOUNTER
----- Message from Brianna Wade MD sent at 8/20/2020  2:51 PM EDT -----  He has Atrial fibrillation on monitor. Please change his appointment from Dianna to me to discuss ablation.     Thanks  Vasile

## 2020-08-28 ENCOUNTER — HOSPITAL ENCOUNTER (OUTPATIENT)
Dept: NON INVASIVE DIAGNOSTICS | Age: 70
Discharge: HOME | End: 2020-08-28
Payer: COMMERCIAL

## 2020-08-28 LAB
LV EF: 58 %
LVEF MODALITY: NORMAL

## 2020-08-28 PROCEDURE — 93306 TTE W/DOPPLER COMPLETE: CPT

## 2020-09-23 NOTE — PROGRESS NOTES
includes Cancer in his brother, brother, and mother; Heart Disease in his brother, father, and sister. Reviewed. Denies family history of sudden cardiac death, arrhythmia, premature CAD    Review of System:  All other systems reviewed and are negative except for that noted above. Pertinent negatives are:   General: negative for fever, chills   Ophthalmic ROS: negative for - eye pain or loss of vision  ENT ROS: negative for - headaches, sore throat   Respiratory: negative for - cough, sputum  Cardiovascular: Reviewed in HPI  Gastrointestinal: negative for - abdominal pain, diarrhea, N/V  Hematology: negative for - bleeding, blood clots, bruising or jaundice  Genito-Urinary:  negative for - Dysuria or incontinence  Musculoskeletal: negative for - Joint swelling, muscle pain  Neurological: negative for - confusion, dizziness, headaches   Psychiatric: No anxiety, no depression. Dermatological: negative for - rash    Physical Examination:  Vitals:    09/24/20 0733   BP: 132/82   Pulse: 82   Resp: 18      Wt Readings from Last 3 Encounters:   09/24/20 166 lb 8 oz (75.5 kg)   08/13/20 166 lb (75.3 kg)   07/22/20 169 lb (76.7 kg)       · Constitutional: Oriented. No distress. · Head: Normocephalic and atraumatic. · Mouth/Throat: Oropharynx is clear and moist.   · Eyes: Conjunctivae normal. EOM are normal.   · Neck: Neck supple. No rigidity. No JVD present. · Cardiovascular: Normal rate, regular rhythm, S1&S2. · Pulmonary/Chest: Bilateral respiratory sounds. No wheezes, No rhonchi. · Abdominal: Soft. Bowel sounds present. No distension, No tenderness. · Musculoskeletal: No tenderness. No edema    · Lymphadenopathy: Has no cervical adenopathy. · Neurological: Alert and oriented. Cranial nerve appears intact, No Gross deficit   · Skin: Skin is warm and dry. No rash noted. · Psychiatric: Has a normal behavior       Labs, diagnostic and imaging results reviewed. Reviewed.    Lab Results   Component Value Date    TSH 2.08 2010    CREATININE 0.9 2018    CREATININE 1.4 2018    AST 22 2017    ALT 27 2017       EC20  Sinus Rhythm [id number],     Echo 2020  Summary   -Normal left ventricle size, wall thickness and systolic function with an   estimated ejection fraction of 55-60%. -No regional wall motion abnormalities are seen.   -Normal diastolic function. E/e'=10.   -Mild mitral regurgitation    MCOT 2020  SR/IVCD/PAF  Prolonged WI interval    Medication:  Current Outpatient Medications   Medication Sig Dispense Refill    propafenone (RYTHMOL SR) 225 MG extended release capsule Take 1 capsule by mouth 2 times daily 180 capsule 3    sennosides-docusate sodium (SENOKOT-S) 8.6-50 MG tablet Take 1 tablet by mouth daily 30 tablet 0    MULTI-DIGESTIVE ENZYMES PO Take 1 capsule by mouth 3 times daily as needed      magnesium lactate (MAG-TAB CR) 84 MG (7MEQ) TBCR extended release tablet Take 210 mg by mouth daily      aspirin 81 MG tablet Take 81 mg by mouth daily \"takes occasionally\"      Multiple Vitamins-Minerals (MULTIVITAMIN ADULT PO) Take 1 tablet by mouth daily      Omega-3 Fatty Acids (OMEGA 3 PO) Take 1 tablet by mouth daily       No current facility-administered medications for this visit. Assessment and plan:     Assessment:  Paroxysmal Atrial Fibrillation  - ECG today shows first degree AV block , RBBB, LAFB  - On propafenone 225 mg bid and Toprol 12.5 mg daily prn  - palpitations 2-3 times daily   - [id number] score: 2 (age, HTN); [id number] Vasc score and anticoagulation discussed. High risk for stroke and thromboembolism. Anticoagulation is recommended.   ~ On ASA 81 mg, however he has stopped taking this  ~ Discussed anticoagulation to decrease the risk of thromboembolic events including stroke. Benefits and alternatives were discussed with patient. Risk of bleeding was discussed. Patient verbalized understanding.  Different forms of anticoagulants including coumadin, Pradaxa, Eliquis, and Xarelto were discussed. ~ Discussed risk of stroke 2.2% yearly, however he does not want to proceed with blood thinner as recommended  - Afib risk factors including age, HTN, obesity, inactivity and SHAISTA were discussed with patient. Risk factor modification recommended              ~ TSH 2.08 (3/22/2010)               - Treatment options including cardioversion, rate control strategy, antiarrhythmics, anticoagulation and possible ablation were discussed with patient. Risks, benefits and alternative of each treatment options were explained. All questions answered     We discussed the conduction system disease that he has and the risk of taking propafenone along with it. Option of ablation of atrial fibrillation to be able to come off propafenone or alternatively need for pacemaker in the future was discussed. Monitor for side effect of propafenone    SHAISTA              - Has been referred in the past and declined    HTN-goal <130/80              - There were no vitals filed for this visit. - Discussed medication management and he declined, discussed risks of untreated HTN and risk factor modification              - Encouraged patient to check BP at home, log and bring to office visits   - Discussed lifestyle modifications, weight loss, low sodium diet    We discussed weight loss and exercise. Blood pressure seems to be better controlled. Thank you for allowing me to participate in the care of Zachariah Thakur. Further evaluation will be based upon the patient's clinical course and testing results. All questions and concerns were addressed to the patient/family. Alternatives to my treatment were discussed. I have discussed the above stated plan and the patient verbalized understanding and agreed with the plan. NOTE: This report was transcribed using voice recognition software.  Every effort was made to ensure accuracy, however, inadvertent computerized transcription errors may be present. Zain Rivas MD, Ch[facility] Jackson [address] [facility]   Office: [phone]     Scribe attestation:  This note was scribed in the presence of Zain Rivas MD by Toshia Gilmore RN    I, Dr. Zain Rivas personally performed the services described in this documentation as scribed by RN in my presence, and it is both accurate and complete.

## 2020-09-24 ENCOUNTER — OFFICE VISIT (OUTPATIENT)
Dept: CARDIOLOGY CLINIC | Age: 70
End: 2020-09-24
Payer: COMMERCIAL

## 2020-09-24 VITALS
SYSTOLIC BLOOD PRESSURE: 132 MMHG | BODY MASS INDEX: 24.66 KG/M2 | HEIGHT: 69 IN | WEIGHT: 166.5 LBS | DIASTOLIC BLOOD PRESSURE: 82 MMHG | RESPIRATION RATE: 18 BRPM | HEART RATE: 82 BPM

## 2020-09-24 PROCEDURE — 93000 ELECTROCARDIOGRAM COMPLETE: CPT | Performed by: INTERNAL MEDICINE

## 2020-09-24 PROCEDURE — 99215 OFFICE O/P EST HI 40 MIN: CPT | Performed by: INTERNAL MEDICINE

## 2020-09-24 RX ORDER — PROPAFENONE HYDROCHLORIDE 225 MG/1
225 CAPSULE, EXTENDED RELEASE ORAL 2 TIMES DAILY
Qty: 180 CAPSULE | Refills: 3 | Status: SHIPPED | OUTPATIENT
Start: 2020-09-24 | End: 2021-10-05 | Stop reason: SDUPTHER

## 2020-11-16 ENCOUNTER — TELEPHONE (OUTPATIENT)
Dept: CARDIOLOGY CLINIC | Age: 70
End: 2020-11-16

## 2020-11-16 NOTE — TELEPHONE ENCOUNTER
Wife calling stating this patient is under a lot of stress at work . He  thinks the stress  is causing him to go in and out of afib. Asking if [facility] would give him a note excusing him from work for a few days to see if that helps ?

## 2020-11-16 NOTE — TELEPHONE ENCOUNTER
Spoke to the pt's wife-gave instructions per Odin Louise CNP. She will discuss the medication addition with the pt, and call back with his decision.

## 2020-11-16 NOTE — TELEPHONE ENCOUNTER
No. Stress does not cause atrial fibrillation. Please see if he is taking metoprolol or not as it is mentioned in Dr. Paulo Seo note, but is not on his MAR.  If he is not taking it, please have him start Toprol XL 12.5 mg daily to see if it helps with his symptoms    Rafiq Cortes, SATYA-CNP

## 2020-11-18 NOTE — PROGRESS NOTES
[address]   Electrophysiology  [facility] APR[facility]  Attending EP: Dr. Amisha Chacon  Date: 11/20/2020  I had the privilege of visiting Kaveh Sommer in the office. Chief Complaint:   Chief Complaint   Patient presents with    Atrial Fibrillation     History of Present Illness: History obtained from patient and medical record. Kaveh Sommer is 72 y.o. male with a past medical history of CKD, splenic aneurysm, and atrial fibrillation. He has been treated with propafenone. He is not on AC due to [id number] 1. He has been referred to sleep medicine in the past, however this was never completed. Presented to the office 8/13/2020 complaining of worsening palpitations. Wore event monitor 8/13/20-9/11/20 that shows episodes of PAF burden <1%, prolonged MO, IVCD,  avr HR 69 (). Interval history: Today, Kaveh Sommer is being seen for afib and MCOT results. Monitor showed AF <1%. He states that he has been having more frequent episodes of afib. He is required to work overtime. 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 He feels when he is under this stress he has more frequent episodes of afib. He is asking for time off work. He has been doing well from cardiac perspective. Continues to work full time as a . He feels he may be overdoing it at work and plans to consider group home soon. His company has not been following safety guidelines set by [employer] regarding workplace safety. Dizziness for a few weeks in the mornings that improves with activity throughout the day. He has not been eating and drinking as much due to masks and avoiding the breakroom. He does not want to take [facility]. He also has not been sleeping well. Denies CP, palpitations, SOB, edema, or orthopnea. Denies having chest pain, palpitations, shortness of breath, edema, or dizziness at the time of this visit.     With regard to medication therapy the patient has been compliant with prescribed regimen. He has tolerated therapy to date. Allergies: Allergies   Allergen Reactions    Phenergan [Promethazine]      Sees words and numbers     Home Medications:  Prior to Visit Medications    Medication Sig Taking? Authorizing Provider   propafenone (RYTHMOL SR) 225 MG extended release capsule Take 1 capsule by mouth 2 times daily  Ronnie Osorio MD   sennosides-docusate sodium (SENOKOT-S) 8.6-50 MG tablet Take 1 tablet by mouth daily  Missy Reyes MD   MULTI-DIGESTIVE ENZYMES PO Take 1 capsule by mouth 3 times daily as needed  Historical Provider, MD   magnesium lactate (MAG-TAB CR) 84 MG (7MEQ) TBCR extended release tablet Take 210 mg by mouth daily  Historical Provider, MD   aspirin 81 MG tablet Take 81 mg by mouth daily \"takes occasionally\"  Historical Provider, MD   Multiple Vitamins-Minerals (MULTIVITAMIN ADULT PO) Take 1 tablet by mouth daily  Historical Provider, MD   Omega-3 Fatty Acids (OMEGA 3 PO) Take 1 tablet by mouth daily  Historical Provider, MD      Past Medical History:  Past Medical History:   Diagnosis Date    Atrial fibrillation ([facility] [address]     PAROXYSMAL    Chronic kidney disease     Diverticulitis     GERD (gastroesophageal reflux disease)     Kidney stones     Snores     Splenic artery aneurysm (HCC)     2.2 CM AS OF 4-2018     Past Surgical History:    has a past surgical history that includes back surgery; Lithotripsy; Cholecystectomy, laparoscopic (12-27-10); Colonoscopy (10/28/14); Cystocopy (06/29/2018); and Cystoscopy (07/31/2018). Social History:  Reviewed. reports that he has never smoked. He has never used smokeless tobacco. He reports that he does not drink alcohol or use drugs. Family History:  Reviewed. family history includes Cancer in his brother, brother, and mother; Heart Disease in his brother, father, and sister.    Denies family history of sudden cardiac death, arrhythmia, premature CAD    Review of System:  · Constitutional: No fevers, chills, weight changes or weakness  · HEENT: No visual changes. No mouth sores or sore throat. · Cardiovascular: denies chest pain, denies dyspnea on exertion, denies palpitations or denies loss of consciousness. No cough, hemoptysis, denies pleuritic pain, or phlebitis. admits to dizziness. · Respiratory: denies cough or wheezing. No hematemesis. · Gastrointestinal: No abdominal pain, blood in stools. · Genitourinary: No dysuria, urgency or hematuria. · Musculoskeletal: denies gait disturbance, No focal muscle weakness. · Integumentary: No rash or pruritis. · Neurological: No headache, change in muscle strength, numbness or tingling. · Psychiatric: No confusion, anxiety, or depression. · Endocrine: No temperature intolerance. No excessive thirst, fluid intake, or urination. · Hem/Lymph: Denies abnormal bruising or bleeding. Physical Examination:  There were no vitals filed for this visit. Wt Readings from Last 3 Encounters:   09/24/20 166 lb 8 oz (75.5 kg)   08/13/20 166 lb (75.3 kg)   07/22/20 169 lb (76.7 kg)      Constitutional: Cooperative and in no apparent distress, and appears well nourished   Skin: Warm and pink; no pallor, cyanosis, bruising, or clubbing   HEENT: Symmetric and normocephalic. Conjunctiva pink with clear sclera. Mucus membranes pink and moist. No visible masses/goiter   Respiratory: Respirations symmetric and unlabored. Lungs clear to auscultation bilaterally, no wheezing, rhonchi, or crackles.  Cardiovascular:  regular rate and rhythm. S1 & S2 present without murmurs, rubs, or gallops. Peripheral pulses 2+, capillary refill < 3 seconds. negative elevation of JVP. No peripheral edema   Gastrointestinal: Abdomen soft and round.  Musculoskeletal:  No focal weakness.  Neurological/Psych: Awake and orientated to person, place and time. Calm affect, appropriate mood.      Pertinent labs, diagnostic, device, and imaging results reviewed as a part of this visit    LABS    CBC:   Lab Results   Component Value Date    WBC 9.5 2018    HGB 16.1 2018    HCT 46.6 2018    MCV 88.5 2018     2018     BMP:   Lab Results   Component Value Date    CREATININE 0.9 2018    BUN 8 2018     2018    K 4.2 2018     2018    CO2 24 2018     CrCl cannot be calculated (Patient's most recent lab result is older than the maximum 120 days allowed. ). No results found for: BNP    Thyroid:   Lab Results   Component Value Date    TSH 2.08 2010     Lipid Panel:   Lab Results   Component Value Date    CHOL 165 2010    HDL 30 2010    TRIG 131 2010     LFTs:  Lab Results   Component Value Date    ALT 27 2017    AST 22 2017    ALKPHOS 58 2017    BILITOT 0.5 2017     Coags:   Lab Results   Component Value Date    PROTIME 11.7 2017    INR 1.04 2017    APTT 28.1 2017     EC2020 SR 1st degree AVB HR 73, , , QTc 448    Echo: 2020  Summary   -Normal left ventricle size, wall thickness and systolic function with an   estimated ejection fraction of 55-60%. -No regional wall motion abnormalities are seen.   -Normal diastolic function. E/e'=10.   -Mild mitral regurgitation  3/27/2017  Summary   -Normal left ventricle size and systolic function with an estimated ejection   fraction of 60-65%. -No regional wall motion abnormalities are seen. -Borderline concentric left ventricular hypertrophy is present.   -Normal diastolic function. E/e'= 7.2 .   -There is trivial mitral and tricuspid regurgitation with RVSP estimated at   26 mmHg. Assessment:  Paroxysmal Atrial Fibrillation  - ECG today shows SB  - On propafenone 225 mg bid and Toprol 12.5 mg daily prn  - palpitations 2-3 times daily   - [id number] score: 2 (age, HTN); [id number] Vasc score and anticoagulation discussed. High risk for stroke and thromboembolism.  Anticoagulation is recommended.   ~ On ASA 81 mg, however he has stopped taking this  ~ Discussed anticoagulation to decrease the risk of thromboembolic events including stroke. Benefits and alternatives were discussed with patient. Risk of bleeding was discussed. Patient verbalized understanding. Different forms of anticoagulants including coumadin, Pradaxa, Eliquis, and Xarelto were discussed. ~ Discussed risk of stroke 2.2% yearly, however he does not want to proceed with blood thinner as recommended. - Afib risk factors including age, HTN, obesity, inactivity and SHAISTA were discussed with patient. Risk factor modification recommended   ~ TSH 2.08 (3/22/2010)     - Treatment options including cardioversion, rate control strategy, antiarrhythmics, anticoagulation and possible ablation were discussed with patient. Risks, benefits and alternative of each treatment options were explained.  All questions answered  Dizziness   - New and no medication changes   - He is not staying hydrated and working long hours into the night   - Recommended hydration and proper sleep schedule   - Wrote letter for frequent breaks and discouraged overtime   - He will call if no imrovement  SHAISTA   - Has been referred in the past and declined  HTN-goal <130/80   - Controlled   - Encouraged patient to check BP at home, log and bring to office visits  - Discussed lifestyle modifications, weight loss, low sodium diet  Plan  - No medication changes  - Call if symptoms worsen  - Monitor BP at home      F/U: Follow-up with EP in 4-6 months NPAL  -Follow up with device clinic as scheduled  -Call Elliot Long at [phone] with any questions    Diet & Exercise:   The patient is counseled to follow a low salt diet to assure blood pressure remains controlled for cardiovascular risk factor modification   The patient is counseled to avoid excess caffeine, and energy drinks as this may exacerbated ectopy and arrhythmia   The patient is counseled to lose weight to control cardiovascular risk factors   Exercise program discussed: To improve overall cardiovascular health, the patient is instructed to increase cardiovascular related activities with a goal of 150 min/week of moderate level activity or 10,000 steps per day. Encouraged to perform as much activity as tolerated     I have addressed the patient's cardiac risk factors and adjusted pharmacologic treatment as needed. In addition, I have reinforced the need for patient directed risk factor modification. I independently reviewed the ECG    All questions and concerns were addressed with the patient. Alternatives to treatment were discussed. Thank you for allowing to us to participate in the care of Miguel Angel BecerrilSATYA Huston-CNP  [facility]   Office: [phone]

## 2020-11-20 ENCOUNTER — OFFICE VISIT (OUTPATIENT)
Dept: CARDIOLOGY CLINIC | Age: 70
End: 2020-11-20
Payer: COMMERCIAL

## 2020-11-20 VITALS
HEART RATE: 67 BPM | BODY MASS INDEX: 24.95 KG/M2 | SYSTOLIC BLOOD PRESSURE: 116 MMHG | OXYGEN SATURATION: 98 % | WEIGHT: 164.6 LBS | DIASTOLIC BLOOD PRESSURE: 78 MMHG | HEIGHT: 68 IN

## 2020-11-20 PROCEDURE — 99214 OFFICE O/P EST MOD 30 MIN: CPT | Performed by: NURSE PRACTITIONER

## 2020-11-20 PROCEDURE — 93000 ELECTROCARDIOGRAM COMPLETE: CPT | Performed by: NURSE PRACTITIONER

## 2020-11-20 NOTE — PATIENT INSTRUCTIONS
- No medication changes  - Call office if symptoms develop  - Check your blood pressure at home, if your top number blood pressure is >150/90 or <90/50 please call the office  - Check your heart rate at home, if it is <50 or >110 please call the office   - Follow up in 6 months

## 2021-03-16 ENCOUNTER — HOSPITAL ENCOUNTER (OUTPATIENT)
Dept: CT IMAGING | Age: 71
Discharge: HOME | End: 2021-03-16
Payer: MEDICARE

## 2021-03-16 DIAGNOSIS — N20.0 CALCULUS OF KIDNEY: ICD-10-CM

## 2021-03-16 PROCEDURE — 74176 CT ABD & PELVIS W/O CONTRAST: CPT

## 2021-05-28 ENCOUNTER — TELEPHONE (OUTPATIENT)
Dept: CARDIOTHORACIC SURGERY | Age: 71
End: 2021-05-28

## 2021-05-28 NOTE — TELEPHONE ENCOUNTER
Patient's wife calling stating that patient had a CT scan done in March and wanted to know if Fernando Levy still needs another scan in July. Please call wife at [phone]. Informed wife that someone would call back on Wednesday.

## 2021-06-03 ENCOUNTER — TELEPHONE (OUTPATIENT)
Dept: VASCULAR SURGERY | Age: 71
End: 2021-06-03

## 2021-06-03 DIAGNOSIS — I72.8 SPLENIC ARTERY ANEURYSM (HCC): Primary | ICD-10-CM

## 2021-06-03 NOTE — TELEPHONE ENCOUNTER
He does not need CT scan in July since splenic aneurysm is stable. Would plan to repeat CTA abd/pelvis in 1 year from previous imaging which was in March 2021 so he would be due in March 2022 and then have him follow up with Dr. Dieudonne Bob at that time after testing.

## 2021-08-13 NOTE — PROGRESS NOTES
[address]   Electrophysiology  Diana Hoyt, APRN-CNP  Attending EP: Dr. Tomas Schools  Date: 8/23/2021  I had the privilege of visiting Dao Chaudhry in the office. Chief Complaint:   Chief Complaint   Patient presents with    Follow-up    Atrial Fibrillation     History of Present Illness: History obtained from patient and medical record. Dao Chaudhry is 80 y.o. male with a past medical history of CKD, splenic aneurysm, and atrial fibrillation. He has been treated with propafenone. He is not on AC due to [id number]-TMXa 1. He has been referred to sleep medicine in the past, however this was never completed. Presented to the office 8/13/2020 complaining of worsening palpitations. Wore event monitor 8/13/20-9/11/20 that shows episodes of PAF burden <1%, prolonged TX, IVCD,  avg HR 69 (). Interval history: Today, Dao Chaudhry is being seen for PAF and hypertension. He has retired since he was seen last.  He has felt better physically since he retired. Dizziness has improved, however continues with quick position changes only. Denies fatigue, pre-syncope, or syncope. BP has been 120's/80's at home. Admits to a few episodes of atrial fibrillation lasting a few hours with HR  when he checked it. Episodes usually occur ones montse few months. Declines AC and has been discussed in detail risks of thromboembolism. He had GI bleeding a few years back and he doesn't want to take blood thinner. He was diagnosed with diverticulosis at that time. Denies having chest pain, palpitations, shortness of breath, orthopnea/PND, cough, or dizziness at the time of this visit. With regard to medication therapy the patient has been compliant with prescribed regimen. He has tolerated therapy to date.       Assessment:  Paroxysmal Atrial Fibrillation  - ECG today shows SR 1st degree AVB  - On propafenone 225 mg bid and Toprol 12.5 mg daily prn   ~ Chronic RBBB  - AF episodes once every 3-4 months lasting a few hours  - [id number] score: 2 (age, HTN); [id number] Vasc score and anticoagulation discussed. High risk for stroke and thromboembolism. Anticoagulation is recommended.   ~ On ASA 81 mg, however he has stopped taking this  ~ Discussed risk of stroke 2.2% yearly, however he does not want to proceed with blood thinner as recommended. ~ Watchman procedure discussed due to hx of GI bleed and he will consider and call if he is interested. Gave handout  - Afib risk factors including age, HTN, obesity, inactivity and SHAISTA were discussed with patient. Risk factor modification recommended   ~ TSH 2.08 (3/22/2010)     - Treatment options including cardioversion, rate control strategy, antiarrhythmics, anticoagulation and possible ablation were discussed with patient. Risks, benefits and alternative of each treatment options were explained. All questions answered    ~ Tx limited due to him declining AC.   Continue propafenone at this time  Dizziness   - Improved, no recurrent pre-syncopee   - Last episode 3 weeks ago   - Denies pre-syncope or syncope and he denies fatigue or activity intolerance   - Hx of RBBB and bifascicular block, declines monitor today, will call if symptoms do not continue to improve  SHAISTA?   - Has been referred in the past and declined  HTN-goal <130/80   - Controlled   - Continue current medications   - Encouraged patient to check BP at home, log and bring to office visits  - Discussed lifestyle modifications, weight loss, low sodium diet  Hx of GI Bleeding   - He is concerned about AC due to hx of GI bleeding   - AC is recommended   - Discussed Watchman procedure  Plan  - No medication changes  - Call if you would like to be considered for Watchman procedure or if would like to proceed with [facility]  - Call if symptoms do not continue to improve    F/U: Follow-up with EP in 6 months   -Follow up with device clinic as scheduled  -Call [facility] at [phone] with any questions    Allergies: Allergies   Allergen Reactions    Phenergan [Promethazine]      Sees words and numbers     Home Medications:  Prior to Visit Medications    Medication Sig Taking? Authorizing Provider   propafenone (RYTHMOL SR) 225 MG extended release capsule Take 1 capsule by mouth 2 times daily  Keri Newby MD   sennosides-docusate sodium (SENOKOT-S) 8.6-50 MG tablet Take 1 tablet by mouth daily  Patient not taking: Reported on 11/20/2020  Gelacio Nj MD   MULTI-DIGESTIVE ENZYMES PO Take 1 capsule by mouth 3 times daily as needed  Historical Provider, MD   magnesium lactate (MAG-TAB CR) 84 MG (7MEQ) TBCR extended release tablet Take 210 mg by mouth daily  Historical Provider, MD   aspirin 81 MG tablet Take 81 mg by mouth daily \"takes occasionally\"  Historical Provider, MD   Multiple Vitamins-Minerals (MULTIVITAMIN ADULT PO) Take 1 tablet by mouth daily  Historical Provider, MD   Omega-3 Fatty Acids (OMEGA 3 PO) Take 1 tablet by mouth daily  Historical Provider, MD      Past Medical History:  Past Medical History:   Diagnosis Date    Atrial fibrillation ([address]     PAROXYSMAL    Chronic kidney disease     Diverticulitis     GERD (gastroesophageal reflux disease)     Kidney stones     Snores     Splenic artery aneurysm ([facility] [address]     2.2 CM AS OF 4-2018     Past Surgical History:    has a past surgical history that includes back surgery; Lithotripsy; Cholecystectomy, laparoscopic (12-27-10); Colonoscopy (10/28/14); Cystocopy (06/29/2018); and Cystoscopy (07/31/2018). Social History:  Reviewed. reports that he has never smoked. He has never used smokeless tobacco. He reports that he does not drink alcohol and does not use drugs. Family History:  Reviewed. family history includes Cancer in his brother, brother, and mother; Heart Disease in his brother, father, and sister.    Denies family history of sudden cardiac death, arrhythmia, premature CAD    Review of System:  · Constitutional: No weight changes or weakness  · HEENT: No visual changes. No mouth sores or sore throat. · Cardiovascular: denies chest pain, denies dyspnea on exertion, admits to palpitations or denies loss of consciousness. No cough, hemoptysis, denies pleuritic pain, or phlebitis. admits to dizziness. · Respiratory: denies cough or wheezing. · Gastrointestinal: Negative, No blood in stools. · Genitourinary: No hematuria. · Neurological: No focal weakness  · Psychiatric: No confusion, anxiety, or depression. · Hem/Lymph: Denies abnormal bruising or bleeding. Physical Examination:  There were no vitals filed for this visit. Wt Readings from Last 3 Encounters:   11/20/20 164 lb 9.6 oz (74.7 kg)   09/24/20 166 lb 8 oz (75.5 kg)   08/13/20 166 lb (75.3 kg)      Constitutional: Cooperative and in no apparent distress, and appears well nourished   Skin: Warm and pink; no cyanosis or bruising   HEENT: Symmetric and normocephalic. Conjunctiva pink with clear sclera. Mucus membranes pink and moist. No visible masses/goiter   Respiratory: Respirations symmetric and unlabored. Lungs clear to auscultation bilaterally, no wheezing, rhonchi, or crackles.  Cardiovascular:  regular rate and rhythm. S1 & S2 present, negative for murmur. negative elevation of JVP. No peripheral edema.  Musculoskeletal:  No focal weakness.  Neurological/Psych: Awake and orientated to person, place and time. Calm affect, appropriate mood.      Pertinent labs, diagnostic, device, and imaging results reviewed as a part of this visit    LABS    CBC:   Lab Results   Component Value Date    WBC 9.5 07/31/2018    HGB 16.1 07/31/2018    HCT 46.6 07/31/2018    MCV 88.5 07/31/2018     07/31/2018     BMP:   Lab Results   Component Value Date    CREATININE 0.9 07/31/2018    BUN 8 07/31/2018     07/31/2018    K 4.2 07/31/2018     07/31/2018    CO2 24 07/31/2018     CrCl cannot be calculated (Patient's most recent lab result is older than the maximum 120 days allowed. ). No results found for: BNP    Thyroid:   Lab Results   Component Value Date    TSH 2.08 2010     Lipid Panel:   Lab Results   Component Value Date    CHOL 165 2010    HDL 30 2010    TRIG 131 2010     LFTs:  Lab Results   Component Value Date    ALT 27 2017    AST 22 2017    ALKPHOS 58 2017    BILITOT 0.5 2017     Coags:   Lab Results   Component Value Date    PROTIME 11.7 2017    INR 1.04 2017    APTT 28.1 2017     EC2021 SR HR 78, , , QTc 439     Echo: 2020  Summary   -Normal left ventricle size, wall thickness and systolic function with an   estimated ejection fraction of 55-60%. -No regional wall motion abnormalities are seen.   -Normal diastolic function. E/e'=10.   -Mild mitral regurgitation  3/27/2017  Summary   -Normal left ventricle size and systolic function with an estimated ejection   fraction of 60-65%. -No regional wall motion abnormalities are seen. -Borderline concentric left ventricular hypertrophy is present.   -Normal diastolic function. E/e'= 7.2 .   -There is trivial mitral and tricuspid regurgitation with RVSP estimated at   26 mmHg. Stress echo:   Normal     Diet & Exercise:   The patient is counseled to follow a low salt diet to assure blood pressure remains controlled for cardiovascular risk factor modification   The patient is counseled to avoid excess caffeine, and energy drinks as this may exacerbated ectopy and arrhythmia   The patient is counseled to lose weight to control cardiovascular risk factors   Exercise program discussed: To improve overall cardiovascular health, the patient is instructed to increase cardiovascular related activities with a goal of 150 min/week of moderate level activity or 10,000 steps per day.  Encouraged to perform as much activity as tolerated     I have addressed the patient's cardiac risk factors and adjusted pharmacologic treatment as needed. In addition, I have reinforced the need for patient directed risk factor modification. I independently reviewed the ECG    All questions and concerns were addressed with the patient. Alternatives to treatment were discussed. Thank you for allowing to us to participate in the care of Brayan Mehta.     SATYA Pascual-CNP  [facility]   Office: [phone]

## 2021-08-23 ENCOUNTER — OFFICE VISIT (OUTPATIENT)
Dept: CARDIOLOGY CLINIC | Age: 71
End: 2021-08-23
Payer: MEDICARE

## 2021-08-23 VITALS
SYSTOLIC BLOOD PRESSURE: 128 MMHG | HEIGHT: 69 IN | BODY MASS INDEX: 25.12 KG/M2 | WEIGHT: 169.6 LBS | DIASTOLIC BLOOD PRESSURE: 84 MMHG | HEART RATE: 87 BPM | OXYGEN SATURATION: 96 %

## 2021-08-23 DIAGNOSIS — R42 DIZZINESS: ICD-10-CM

## 2021-08-23 DIAGNOSIS — Z87.19 HISTORY OF GI BLEED: ICD-10-CM

## 2021-08-23 DIAGNOSIS — I48.0 PAF (PAROXYSMAL ATRIAL FIBRILLATION) (HCC): Primary | ICD-10-CM

## 2021-08-23 DIAGNOSIS — I10 BENIGN ESSENTIAL HTN: ICD-10-CM

## 2021-08-23 PROCEDURE — 93000 ELECTROCARDIOGRAM COMPLETE: CPT | Performed by: NURSE PRACTITIONER

## 2021-08-23 PROCEDURE — 4040F PNEUMOC VAC/ADMIN/RCVD: CPT | Performed by: NURSE PRACTITIONER

## 2021-08-23 PROCEDURE — G8427 DOCREV CUR MEDS BY ELIG CLIN: HCPCS | Performed by: NURSE PRACTITIONER

## 2021-08-23 PROCEDURE — G8417 CALC BMI ABV UP PARAM F/U: HCPCS | Performed by: NURSE PRACTITIONER

## 2021-08-23 PROCEDURE — 3017F COLORECTAL CA SCREEN DOC REV: CPT | Performed by: NURSE PRACTITIONER

## 2021-08-23 PROCEDURE — 99214 OFFICE O/P EST MOD 30 MIN: CPT | Performed by: NURSE PRACTITIONER

## 2021-08-23 PROCEDURE — 1036F TOBACCO NON-USER: CPT | Performed by: NURSE PRACTITIONER

## 2021-08-23 PROCEDURE — 1123F ACP DISCUSS/DSCN MKR DOCD: CPT | Performed by: NURSE PRACTITIONER

## 2021-08-23 NOTE — PATIENT INSTRUCTIONS
- Continue current medications  - Call office if symptoms do not improve and will get stress testing  - Call office symptoms improving  - Call if interested in being considered for Watchman procedure  - Follow up in 6 months

## 2021-10-05 RX ORDER — PROPAFENONE HYDROCHLORIDE 225 MG/1
225 CAPSULE, EXTENDED RELEASE ORAL 2 TIMES DAILY
Qty: 180 CAPSULE | Refills: 3 | Status: SHIPPED | OUTPATIENT
Start: 2021-10-05 | End: 2021-10-08 | Stop reason: SDUPTHER

## 2021-10-08 RX ORDER — PROPAFENONE HYDROCHLORIDE 225 MG/1
225 CAPSULE, EXTENDED RELEASE ORAL 2 TIMES DAILY
Qty: 180 CAPSULE | Refills: 3 | Status: SHIPPED | OUTPATIENT
Start: 2021-10-08

## 2021-10-08 NOTE — TELEPHONE ENCOUNTER
Patient's wife calling, rx for propafenone was sent to [id number] [facility] outpatient pharmacy and they are needing rx to be sent to [facility] in [place]. Can this please be re-sent?

## 2021-12-08 ENCOUNTER — HOSPITAL ENCOUNTER (OUTPATIENT)
Dept: GENERAL RADIOLOGY | Age: 71
Discharge: HOME | End: 2021-12-08
Payer: MEDICARE

## 2021-12-08 ENCOUNTER — HOSPITAL ENCOUNTER (OUTPATIENT)
Age: 71
Discharge: HOME | End: 2021-12-08
Payer: MEDICARE

## 2021-12-08 DIAGNOSIS — N20.0 CALCULUS OF KIDNEY: ICD-10-CM

## 2021-12-08 PROCEDURE — 74018 RADEX ABDOMEN 1 VIEW: CPT

## 2022-02-22 ENCOUNTER — TELEPHONE (OUTPATIENT)
Dept: VASCULAR SURGERY | Age: 72
End: 2022-02-22

## 2022-02-22 DIAGNOSIS — I72.8 SPLENIC ARTERY ANEURYSM (HCC): Primary | ICD-10-CM

## 2022-02-22 PROBLEM — G47.33 OSA TREATED WITH BIPAP: Status: ACTIVE | Noted: 2022-02-22

## 2022-02-22 NOTE — PROGRESS NOTES
[facility]   Electrophysiology Follow up   Date: 2/23/2022  I had the privilege of visiting Kelsi Santana in the office. CC: follow up afib   HPI: Kelsi Santana is a 80 y.o. male with a past medical history of CKD, splenic aneurysm, and atrial fibrillation. He has been treated with propafenone. He is not on AC due to [id number]-HYPf 1. He has been referred to sleep medicine in the past, however this was never completed. Interval History :  Lakshmi Garcia presents today in follow up. He feels like his afib is alitte better. Patient denies lightheadedness, dizziness, chest pain, palpitations, orthopnea, edema, presyncope or syncope. Assessment and plan:     Paroxysmal Atrial Fibrillation   - ECG today show sinus with RBBB    - On propafenone 225 mg bid     - [id number] score: 2 (age, HTN); [id number] Vasc score and anticoagulation discussed. High risk for stroke and thromboembolism. Anticoagulation is recommended. He declines AC due to history of GI bleed - 2/2 diverticulosis    ~ Watchman procedure was discussed 08/23/2021 and education provided    ~ On ASA 81 mg, however he has stopped taking this   ~ Discussed anticoagulation to decrease the risk of thromboembolic events including stroke. Benefits and alternatives were discussed with patient. Risk of bleeding was discussed. Patient verbalized understanding. Different forms of anticoagulants including coumadin, Pradaxa, Eliquis, and Xarelto were discussed. ~ Discussed risk of stroke 2.2% yearly, however he does not want to proceed with blood thinner as recommended   - Afib risk factors including age, HTN, obesity, inactivity and SHAISTA were discussed with patient. Risk factor modification recommended              ~ TSH 2.08 (3/22/2010)               - Treatment options including cardioversion, rate control strategy, antiarrhythmics, anticoagulation and possible ablation were discussed with patient.  Risks, benefits and alternative of each treatment options were explained. All questions answered      We discussed the conduction system disease that he has and the risk of taking propafenone along with it. Option of ablation of atrial fibrillation to be able to come off propafenone or alternatively need for pacemaker in the future was discussed.     Monitor for side effect of propafenone. At this point he does not want to take anticoagulation. He wants to continue with the propafenone. I asked him to monitor for any signs of a stroke and or recurrence of atrial fibrillation. EKG is essentially unchanged from the previous one.     History of GI bleed 2014   - in setting of diverticulosis   - Watchman has been discussed in the past       suspected SHAISTA              - Has been referred in the past and declined     HTN  -Borderline 135/85   -BP goal <130/80  -Home BP monitoring encouraged, printed information provided on how to accurately measure BP at home.  -Counseled to follow a low salt diet to assure blood pressure remains controlled for cardiovascular risk factor modification.   -The patient is counseled to get regular exercise 3-5 times per week and maintain a healthy weight reduce cardiovascular risk factors.           Plan:   Advise blood thinner- continue ASA   Monitor for side effects of propafenone  Follow up in 6 months with [facility]     Patient Active Problem List    Diagnosis Date Noted    Palpitations 03/27/2017    PAF (paroxysmal atrial fibrillation) ([facility] [address] 03/27/2017    SHAISTA treated with BiPAP 02/22/2022    Renal stones 06/29/2018    Near syncope 03/27/2017    GERD (gastroesophageal reflux disease) 03/27/2017    BPH (benign prostatic hyperplasia) 03/27/2017    HTN (hypertension) 03/27/2017    Bradycardia 03/27/2017    Sprain of left elbow [facility]  DOI 1/12/16 01/21/2016    Left wrist sprain/[facility]  DOI 1/12/16 01/15/2016    Left elbow contusion [facility]  DOI 1/12/16 01/15/2016    Closed nondisplaced fracture of triquetrum of left wrist [facility] DOI 1/12/16 01/15/2016    LGI bleed 10/27/2014    LUQ pain 06/25/2013    Duodenal diverticulum 06/25/2013     Diagnostic studies:   EKG today  Sinus With RBBB     Echo: 8/28/2020  Summary   -Normal left ventricle size, wall thickness and systolic function with an   estimated ejection fraction of 55-60%.  -No regional wall motion abnormalities are seen.   -Normal diastolic function. E/e'=10.   -Mild mitral regurgitation    Echo 3/27/2017  Summary   -Normal left ventricle size and systolic function with an estimated ejection   fraction of 60-65%.  -No regional wall motion abnormalities are seen.   -Borderline concentric left ventricular hypertrophy is present.   -Normal diastolic function. E/e'= 7.2 .   -There is trivial mitral and tricuspid regurgitation with RVSP estimated at   26 mmHg.     Stress echo: 2015  Normal          I independently reviewed the cardiac diagnostic studies, ECG and relevant imaging studies. Lab Results   Component Value Date    LVEF 58 08/28/2020    LVEFMODE Echo 03/27/2017     Lab Results   Component Value Date    TSH 2.08 03/22/2010         Physical Examination:  Vitals:    02/23/22 1459   BP: 135/85   Pulse: 82   SpO2: 97%      Wt Readings from Last 3 Encounters:   02/23/22 165 lb 6.4 oz (75 kg)   08/23/21 169 lb 9.6 oz (76.9 kg)   11/20/20 164 lb 9.6 oz (74.7 kg)       · Constitutional: Oriented. No distress. · Head: Normocephalic and atraumatic. · Mouth/Throat: Oropharynx is clear and moist.   · Eyes: Conjunctivae normal. EOM are normal.   · Neck: Neck supple. No rigidity. No JVD present. · Cardiovascular: Normal rate, regular rhythm, S1&S2. · Pulmonary/Chest: Bilateral respiratory sounds. No wheezes, No rhonchi. · Abdominal: Soft. Bowel sounds present. No distension, No tenderness. · Musculoskeletal: No tenderness. No edema    · Lymphadenopathy: Has no cervical adenopathy. · Neurological: Alert and oriented.  Cranial nerve appears intact, No Gross deficit · Skin: Skin is warm and dry. No rash noted. · Psychiatric: Has a normal behavior       Review of System:  [x] Full ROS obtained and negative except as mentioned in HPI    Prior to Admission medications    Medication Sig Start Date End Date Taking? Authorizing Provider   propafenone (RYTHMOL SR) 225 MG extended release capsule Take 1 capsule by mouth 2 times daily 10/8/21  Yes Zorita Ormond, MD   ZINC PO Take by mouth   Yes Historical Provider, MD   Cholecalciferol (VITAMIN D3 MAXIMUM STRENGTH PO) Take by mouth   Yes Historical Provider, MD   MAGNESIUM OXIDE PO Take by mouth   Yes Historical Provider, MD   Coenzyme Q10 (COQ10 PO) Take by mouth   Yes Historical Provider, MD   Ascorbic Acid (VITAMIN C ER PO) Take by mouth   Yes Historical Provider, MD   Probiotic Product (PROBIOTIC DAILY PO) Take by mouth   Yes Historical Provider, MD   Psyllium (METAMUCIL FIBER PO) Take by mouth   Yes Historical Provider, MD   sennosides-docusate sodium (SENOKOT-S) 8.6-50 MG tablet Take 1 tablet by mouth daily 6/29/18  Yes Torie Whitley MD   MULTI-DIGESTIVE ENZYMES PO Take 1 capsule by mouth 3 times daily as needed   Yes Historical Provider, MD   magnesium lactate (MAG-TAB CR) 84 MG (7MEQ) TBCR extended release tablet Take 210 mg by mouth daily   Yes Historical Provider, MD   Multiple Vitamins-Minerals (MULTIVITAMIN ADULT PO) Take 1 tablet by mouth daily    Yes Historical Provider, MD   Omega-3 Fatty Acids (OMEGA 3 PO) Take 1 tablet by mouth daily    Yes Historical Provider, MD   aspirin 81 MG tablet Take 81 mg by mouth daily \"takes occasionally\"  Patient not taking: Reported on 8/23/2021    Historical Provider, MD       Allergies   Allergen Reactions    Phenergan [Promethazine]      Sees words and numbers       Social History:  Reviewed. reports that he has never smoked. He has never used smokeless tobacco. He reports that he does not drink alcohol and does not use drugs. Family History:  Reviewed. Reviewed.  No family history of SCD. Relevant and available labs, and cardiovascular diagnostics reviewed. Reviewed. I independently reviewed relevant and available cardiac diagnostic tests ECG, CXR, Echo, Stress test, Device interrogation, Holter, CT scan. Outside medical records via Care everywhere reviewed and summarized in H&P above. Complex medical condition with multiple medical problems affecting prognosis and outcome of EP interventions       - The patient is counseled to follow a low salt diet to assure blood pressure remains controlled for cardiovascular risk factor modification.   - The patient is counseled to avoid excess caffeine, and energy drinks as this may exacerbated ectopy and arrhythmia. - The patient is counseled to get regular exercise 3-5 times per week to control cardiovascular risk factors. -     All questions and concerns were addressed to the patient/family. Alternatives to my treatment were discussed. I have discussed the above stated plan and the patient verbalized understanding and agreed with the plan. Scribe attestation: This note was scribed in the presence of  Nyla Rowell MD by Mague Kapoor RN    I, Dr. Nyla Rowell personally performed the services described in this documentation as scribed by RN in my presence, and it is both accurate and complete.        NOTE: This report was transcribed using voice recognition software. Every effort was made to ensure accuracy, however, inadvertent computerized transcription errors may be present.      Nyla Rowell MD, [address]   Office: [phone]  Fax: [phone]

## 2022-02-23 ENCOUNTER — OFFICE VISIT (OUTPATIENT)
Dept: CARDIOLOGY CLINIC | Age: 72
End: 2022-02-23
Payer: MEDICARE

## 2022-02-23 VITALS
HEIGHT: 69 IN | HEART RATE: 82 BPM | BODY MASS INDEX: 24.5 KG/M2 | WEIGHT: 165.4 LBS | DIASTOLIC BLOOD PRESSURE: 85 MMHG | SYSTOLIC BLOOD PRESSURE: 135 MMHG | OXYGEN SATURATION: 97 %

## 2022-02-23 DIAGNOSIS — I48.0 PAF (PAROXYSMAL ATRIAL FIBRILLATION) (HCC): Primary | ICD-10-CM

## 2022-02-23 DIAGNOSIS — G47.33 OSA TREATED WITH BIPAP: ICD-10-CM

## 2022-02-23 DIAGNOSIS — I10 PRIMARY HYPERTENSION: ICD-10-CM

## 2022-02-23 DIAGNOSIS — Z79.899 HIGH RISK MEDICATION USE: ICD-10-CM

## 2022-02-23 DIAGNOSIS — K92.2 LGI BLEED: ICD-10-CM

## 2022-02-23 PROCEDURE — 3017F COLORECTAL CA SCREEN DOC REV: CPT | Performed by: INTERNAL MEDICINE

## 2022-02-23 PROCEDURE — 99215 OFFICE O/P EST HI 40 MIN: CPT | Performed by: INTERNAL MEDICINE

## 2022-02-23 PROCEDURE — 1036F TOBACCO NON-USER: CPT | Performed by: INTERNAL MEDICINE

## 2022-02-23 PROCEDURE — G8484 FLU IMMUNIZE NO ADMIN: HCPCS | Performed by: INTERNAL MEDICINE

## 2022-02-23 PROCEDURE — 93000 ELECTROCARDIOGRAM COMPLETE: CPT | Performed by: INTERNAL MEDICINE

## 2022-02-23 PROCEDURE — 4040F PNEUMOC VAC/ADMIN/RCVD: CPT | Performed by: INTERNAL MEDICINE

## 2022-02-23 PROCEDURE — G8420 CALC BMI NORM PARAMETERS: HCPCS | Performed by: INTERNAL MEDICINE

## 2022-02-23 PROCEDURE — G8427 DOCREV CUR MEDS BY ELIG CLIN: HCPCS | Performed by: INTERNAL MEDICINE

## 2022-02-23 PROCEDURE — 1123F ACP DISCUSS/DSCN MKR DOCD: CPT | Performed by: INTERNAL MEDICINE

## 2022-03-09 ENCOUNTER — TELEPHONE (OUTPATIENT)
Dept: CARDIOLOGY CLINIC | Age: 72
End: 2022-03-09

## 2022-03-09 NOTE — TELEPHONE ENCOUNTER
Tuesday morning he went into AF. HR has been 60-90's. Sometimes up to 100's. He feels his heart fluttering barely. He is on propafenone 225 mg bid. I recommended starting 5 mg 2 times daily and stop aspirin. He doesn't know if he will take the blood thinner long-term, however he is considering it for at least 2 week. Discussed risks of stroke in detail and has previously discussed in office. They are agreeable to consider and asked that I send RX. Gave information on free trial that we have free trial cards in office if he would like us to provide. He will call back if not resolved, HR elevated, he feels worsening symptoms, or episodes are recurrent.       SATYA Del Rio-CNP

## 2022-03-10 ENCOUNTER — TELEPHONE (OUTPATIENT)
Dept: CARDIOLOGY CLINIC | Age: 72
End: 2022-03-10

## 2022-03-10 NOTE — TELEPHONE ENCOUNTER
Called pt about the message below and spoke to the patient wife and she verbalized understanding and will be here tomorrow to pick them up. Have been placed at the .

## 2022-03-10 NOTE — TELEPHONE ENCOUNTER
Pt wife called back to speak to Dianna to let her know that pt HR is I  Normal Rhythm now. Pt wife Dale Maria  also stated that [facility] wanted the pt to start Eliquis 5 mg and gave them the website to get a coupon, pt wife she went on the website and stated the coupon  21 they called the pharmacy and the RX is over $500 they want to know where they can get an updated coupon?       Pls advice thank you     Dale Candace   [phone]

## 2022-03-18 ENCOUNTER — HOSPITAL ENCOUNTER (OUTPATIENT)
Dept: CT IMAGING | Age: 72
Discharge: HOME | End: 2022-03-18
Payer: MEDICARE

## 2022-03-18 DIAGNOSIS — I72.8 SPLENIC ARTERY ANEURYSM (HCC): ICD-10-CM

## 2022-03-18 PROCEDURE — 74176 CT ABD & PELVIS W/O CONTRAST: CPT

## 2022-03-21 ENCOUNTER — TELEPHONE (OUTPATIENT)
Dept: VASCULAR SURGERY | Age: 72
End: 2022-03-21

## 2022-03-21 DIAGNOSIS — I72.8 SPLENIC ARTERY ANEURYSM (HCC): Primary | ICD-10-CM

## 2022-10-12 ENCOUNTER — HOSPITAL ENCOUNTER (OUTPATIENT)
Dept: CT IMAGING | Age: 72
Discharge: HOME | End: 2022-10-12
Payer: MEDICARE

## 2022-10-12 DIAGNOSIS — N20.1 CALCULUS OF URETER: ICD-10-CM

## 2022-10-12 PROCEDURE — 74176 CT ABD & PELVIS W/O CONTRAST: CPT

## 2022-10-18 RX ORDER — PROPAFENONE HYDROCHLORIDE 225 MG/1
CAPSULE, EXTENDED RELEASE ORAL
Qty: 180 CAPSULE | Refills: 3 | OUTPATIENT
Start: 2022-10-18

## 2022-10-18 NOTE — TELEPHONE ENCOUNTER
Patient has requested a refill from his Doctor at [facility] AND [facility]

## 2022-10-27 ENCOUNTER — HOSPITAL ENCOUNTER (OUTPATIENT)
Dept: GENERAL RADIOLOGY | Age: 72
Discharge: HOME | End: 2022-10-27
Payer: MEDICARE

## 2022-10-27 DIAGNOSIS — R31.21 ASYMPTOMATIC MICROSCOPIC HEMATURIA: ICD-10-CM

## 2022-10-27 PROCEDURE — 6360000004 HC RX CONTRAST MEDICATION

## 2022-10-27 PROCEDURE — 2580000003 HC RX 258

## 2022-10-27 PROCEDURE — 74400 UROGRAPHY IV +-KUB TOMOG: CPT

## 2022-10-27 RX ORDER — SODIUM CHLORIDE 9 MG/ML
INJECTION, SOLUTION INTRAVENOUS
Status: COMPLETED
Start: 2022-10-27 | End: 2022-10-27

## 2022-10-27 RX ADMIN — SODIUM CHLORIDE 100 ML: 0.9 INJECTION INTRAVENOUS at 13:03

## 2022-10-27 RX ADMIN — IOPAMIDOL 50 ML: 755 INJECTION, SOLUTION INTRAVENOUS at 13:13

## 2022-12-19 SDOH — HEALTH STABILITY: PHYSICAL HEALTH: ON AVERAGE, HOW MANY DAYS PER WEEK DO YOU ENGAGE IN MODERATE TO STRENUOUS EXERCISE (LIKE A BRISK WALK)?: 5 DAYS

## 2022-12-19 SDOH — HEALTH STABILITY: PHYSICAL HEALTH: ON AVERAGE, HOW MANY MINUTES DO YOU ENGAGE IN EXERCISE AT THIS LEVEL?: 20 MIN

## 2022-12-22 ENCOUNTER — HOSPITAL ENCOUNTER (OUTPATIENT)
Dept: GENERAL RADIOLOGY | Age: 72
Discharge: HOME | End: 2022-12-22
Payer: MEDICARE

## 2022-12-22 ENCOUNTER — OFFICE VISIT (OUTPATIENT)
Dept: INTERNAL MEDICINE CLINIC | Age: 72
End: 2022-12-22

## 2022-12-22 ENCOUNTER — HOSPITAL ENCOUNTER (OUTPATIENT)
Age: 72
Discharge: HOME | End: 2022-12-22
Payer: MEDICARE

## 2022-12-22 VITALS
WEIGHT: 175.2 LBS | OXYGEN SATURATION: 97 % | SYSTOLIC BLOOD PRESSURE: 128 MMHG | BODY MASS INDEX: 26.55 KG/M2 | HEART RATE: 61 BPM | HEIGHT: 68 IN | DIASTOLIC BLOOD PRESSURE: 82 MMHG

## 2022-12-22 DIAGNOSIS — E78.49 OTHER HYPERLIPIDEMIA: Primary | ICD-10-CM

## 2022-12-22 DIAGNOSIS — Z12.5 SPECIAL SCREENING, PROSTATE CANCER: ICD-10-CM

## 2022-12-22 DIAGNOSIS — N40.1 BENIGN PROSTATIC HYPERPLASIA WITH URINARY FREQUENCY: ICD-10-CM

## 2022-12-22 DIAGNOSIS — I48.0 PAF (PAROXYSMAL ATRIAL FIBRILLATION) (HCC): ICD-10-CM

## 2022-12-22 DIAGNOSIS — I10 PRIMARY HYPERTENSION: ICD-10-CM

## 2022-12-22 DIAGNOSIS — R35.0 BENIGN PROSTATIC HYPERPLASIA WITH URINARY FREQUENCY: ICD-10-CM

## 2022-12-22 DIAGNOSIS — R73.9 HYPERGLYCEMIA: ICD-10-CM

## 2022-12-22 DIAGNOSIS — Z00.00 PREVENTATIVE HEALTH CARE: ICD-10-CM

## 2022-12-22 DIAGNOSIS — E78.49 OTHER HYPERLIPIDEMIA: ICD-10-CM

## 2022-12-22 DIAGNOSIS — M54.6 ACUTE BILATERAL THORACIC BACK PAIN: ICD-10-CM

## 2022-12-22 DIAGNOSIS — Z13.220 SCREENING FOR CHOLESTEROL LEVEL: ICD-10-CM

## 2022-12-22 PROBLEM — R00.2 PALPITATIONS: Status: RESOLVED | Noted: 2017-03-27 | Resolved: 2022-12-22

## 2022-12-22 PROBLEM — R00.1 BRADYCARDIA: Status: RESOLVED | Noted: 2017-03-27 | Resolved: 2022-12-22

## 2022-12-22 PROBLEM — R55 NEAR SYNCOPE: Status: RESOLVED | Noted: 2017-03-27 | Resolved: 2022-12-22

## 2022-12-22 LAB
A/G RATIO: 1.6 (ref 1.1–2.2)
ALBUMIN SERPL-MCNC: 4.2 G/DL (ref 3.4–5)
ALP BLD-CCNC: 82 U/L (ref 40–129)
ALT SERPL-CCNC: 22 U/L (ref 10–40)
ANION GAP SERPL CALCULATED.3IONS-SCNC: 11 MMOL/L (ref 3–16)
AST SERPL-CCNC: 19 U/L (ref 15–37)
BASOPHILS ABSOLUTE: 0.1 K/UL (ref 0–0.2)
BASOPHILS RELATIVE PERCENT: 0.4 %
BILIRUB SERPL-MCNC: 0.6 MG/DL (ref 0–1)
BUN BLDV-MCNC: 14 MG/DL (ref 7–20)
CALCIUM SERPL-MCNC: 9.7 MG/DL (ref 8.3–10.6)
CHLORIDE BLD-SCNC: 102 MMOL/L (ref 99–110)
CHOLESTEROL, TOTAL: 116 MG/DL (ref 0–199)
CO2: 27 MMOL/L (ref 21–32)
CREAT SERPL-MCNC: 1 MG/DL (ref 0.8–1.3)
EOSINOPHILS ABSOLUTE: 0.4 K/UL (ref 0–0.6)
EOSINOPHILS RELATIVE PERCENT: 3.1 %
GFR SERPL CREATININE-BSD FRML MDRD: >60 ML/MIN/{1.73_M2}
GLUCOSE BLD-MCNC: 95 MG/DL (ref 70–99)
HCT VFR BLD CALC: 45 % (ref 40.5–52.5)
HDLC SERPL-MCNC: 26 MG/DL (ref 40–60)
HEMOGLOBIN: 15 G/DL (ref 13.5–17.5)
LDL CHOLESTEROL CALCULATED: 44 MG/DL
LYMPHOCYTES ABSOLUTE: 3.3 K/UL (ref 1–5.1)
LYMPHOCYTES RELATIVE PERCENT: 28.4 %
MCH RBC QN AUTO: 29.8 PG (ref 26–34)
MCHC RBC AUTO-ENTMCNC: 33.2 G/DL (ref 31–36)
MCV RBC AUTO: 89.7 FL (ref 80–100)
MONOCYTES ABSOLUTE: 1.2 K/UL (ref 0–1.3)
MONOCYTES RELATIVE PERCENT: 10.5 %
NEUTROPHILS ABSOLUTE: 6.7 K/UL (ref 1.7–7.7)
NEUTROPHILS RELATIVE PERCENT: 57.6 %
PDW BLD-RTO: 13.4 % (ref 12.4–15.4)
PLATELET # BLD: 297 K/UL (ref 135–450)
PMV BLD AUTO: 9 FL (ref 5–10.5)
POTASSIUM SERPL-SCNC: 4.2 MMOL/L (ref 3.5–5.1)
PROSTATE SPECIFIC ANTIGEN: 2.68 NG/ML (ref 0–4)
RBC # BLD: 5.02 M/UL (ref 4.2–5.9)
SODIUM BLD-SCNC: 140 MMOL/L (ref 136–145)
TOTAL PROTEIN: 6.9 G/DL (ref 6.4–8.2)
TRIGL SERPL-MCNC: 228 MG/DL (ref 0–150)
TSH SERPL DL<=0.05 MIU/L-ACNC: 1.42 UIU/ML (ref 0.27–4.2)
VLDLC SERPL CALC-MCNC: 46 MG/DL
WBC # BLD: 11.5 K/UL (ref 4–11)

## 2022-12-22 PROCEDURE — 72074 X-RAY EXAM THORAC SPINE4/>VW: CPT

## 2022-12-22 PROCEDURE — 72114 X-RAY EXAM L-S SPINE BENDING: CPT

## 2022-12-22 RX ORDER — ASPIRIN 81 MG/1
81 TABLET ORAL DAILY PRN
COMMUNITY

## 2022-12-22 RX ORDER — CLOPIDOGREL BISULFATE 75 MG/1
75 TABLET ORAL DAILY
COMMUNITY

## 2022-12-22 SDOH — ECONOMIC STABILITY: FOOD INSECURITY: WITHIN THE PAST 12 MONTHS, THE FOOD YOU BOUGHT JUST DIDN'T LAST AND YOU DIDN'T HAVE MONEY TO GET MORE.: NEVER TRUE

## 2022-12-22 SDOH — ECONOMIC STABILITY: FOOD INSECURITY: WITHIN THE PAST 12 MONTHS, YOU WORRIED THAT YOUR FOOD WOULD RUN OUT BEFORE YOU GOT MONEY TO BUY MORE.: NEVER TRUE

## 2022-12-22 ASSESSMENT — ENCOUNTER SYMPTOMS
WHEEZING: 0
COLOR CHANGE: 0
COUGH: 0
BLOOD IN STOOL: 0
CONSTIPATION: 0
SHORTNESS OF BREATH: 0
CHEST TIGHTNESS: 0
SINUS PAIN: 0
ABDOMINAL PAIN: 0

## 2022-12-22 ASSESSMENT — PATIENT HEALTH QUESTIONNAIRE - PHQ9
SUM OF ALL RESPONSES TO PHQ QUESTIONS 1-9: 0
2. FEELING DOWN, DEPRESSED OR HOPELESS: 0
SUM OF ALL RESPONSES TO PHQ QUESTIONS 1-9: 0
SUM OF ALL RESPONSES TO PHQ9 QUESTIONS 1 & 2: 0
1. LITTLE INTEREST OR PLEASURE IN DOING THINGS: 0

## 2022-12-22 ASSESSMENT — SOCIAL DETERMINANTS OF HEALTH (SDOH): HOW HARD IS IT FOR YOU TO PAY FOR THE VERY BASICS LIKE FOOD, HOUSING, MEDICAL CARE, AND HEATING?: NOT HARD AT ALL

## 2022-12-22 NOTE — PROGRESS NOTES
Leidy Andrew (:  1950) is a 71 y.o. male,New patient, here for evaluation of the following chief complaint(s):  Established New Doctor         ASSESSMENT/PLAN:  1. Other hyperlipidemia  -     Lipid Panel; Future  2. Preventative health care  3. Screening for cholesterol level  4. Special screening, prostate cancer  -     PSA, Prostatic Specific Antigen; Future  5. PAF (paroxysmal atrial fibrillation) (HCC)  -     TSH; Future  6. Primary hypertension  -     TSH; Future  -     CBC with Auto Differential; Future  -     Comprehensive Metabolic Panel; Future  7. Hyperglycemia  -     Hemoglobin A1C; Future  8. Benign prostatic hyperplasia with urinary frequency   -     PSA, Prostatic Specific Antigen; Future  9. Acute bilateral thoracic back pain  -     XR THORACIC SPINE (MIN 4 VIEWS); Future  -     XR LUMBAR SPINE (MIN 6 VIEWS);  Future  I reviewed patient his medical history patient had recent heart attack for which she was being treated by cardiology he is symptom-free now and is doing fairly well    Patient has chronic history of atrial fibrillation both the patient and his wife had questions in regards to the anticoagulation all have their concerns about the differences between Plavix and Eliquis were addressed and at the conclusion they decided they want to stay with the Plavix and not to use the Eliquis at this point    Patient blood pressure is well controlled we will continue the same medication monitor clinically    Patient's been having this thoracoabdominal pain that he has been investigated for by nephrology/urology and so far normal nothing is apparent the distribution of the pain is consistent with a distal dermatomal distribution of T10-T11 with an oblique distribution going down to the front of the abdomen starting from the back and at this point an x-ray to be done to make sure there is no bony derangement but some exercises will be given to the patient so that he can start working on it      Return in about 3 months (around 3/22/2023).          Subjective   SUBJECTIVE/OBJECTIVE:    Lab Review   Lab Results   Component Value Date/Time     07/31/2018 11:56 AM     06/30/2018 06:42 AM     06/29/2018 11:39 AM    K 4.2 07/31/2018 11:56 AM    K 4.7 06/30/2018 06:42 AM    K 4.2 06/29/2018 11:39 AM    K 4.0 03/28/2017 06:22 AM    CO2 24 07/31/2018 11:56 AM    CO2 20 06/30/2018 06:42 AM    CO2 25 06/29/2018 11:39 AM    BUN 8 07/31/2018 11:56 AM    BUN 17 06/30/2018 06:42 AM    BUN 13 06/29/2018 11:39 AM    CREATININE 0.9 07/31/2018 11:56 AM    CREATININE 1.4 06/30/2018 06:42 AM    CREATININE 0.8 06/29/2018 11:39 AM    GLUCOSE 101 07/31/2018 11:56 AM    GLUCOSE 160 06/30/2018 06:42 AM    GLUCOSE 102 06/29/2018 11:39 AM    CALCIUM 9.3 07/31/2018 11:56 AM    CALCIUM 7.7 06/30/2018 06:42 AM    CALCIUM 9.2 06/29/2018 11:39 AM     Lab Results   Component Value Date/Time    WBC 9.5 07/31/2018 11:56 AM    WBC 9.9 06/29/2018 11:39 AM    WBC 8.6 03/28/2017 06:21 AM    HGB 16.1 07/31/2018 11:56 AM    HGB 13.3 06/30/2018 06:42 AM    HGB 15.5 06/29/2018 11:39 AM    HCT 46.6 07/31/2018 11:56 AM    HCT 40.0 06/30/2018 06:42 AM    HCT 46.2 06/29/2018 11:39 AM    MCV 88.5 07/31/2018 11:56 AM    MCV 87.4 06/29/2018 11:39 AM    MCV 88.4 03/28/2017 06:21 AM     07/31/2018 11:56 AM     06/29/2018 11:39 AM     03/28/2017 06:21 AM     Lab Results   Component Value Date/Time    CHOL 165 03/22/2010 07:03 AM    TRIG 131 03/22/2010 07:03 AM    HDL 30 03/22/2010 07:03 AM       Vitals 12/22/2022 2/23/2022 9/75/4679   SYSTOLIC 032 570 652   DIASTOLIC 82 85 84   Site - - -   Position - - -   Cuff Size - - -   Pulse 61 82 87   Temp - - -   Resp - - -   SpO2 97 97 96   Weight 175 lb 3.2 oz 165 lb 6.4 oz 169 lb 9.6 oz   Height 5' 8\" 5' 8.5\" 5' 8.5\"   Body mass index 26.64 kg/m2 24.78 kg/m2 25.41 kg/m2   Pain Level - - -   Some recent data might be hidden       Hyperlipidemia  This is a chronic problem. The current episode started more than 1 year ago. The problem is controlled. Recent lipid tests were reviewed and are normal. Pertinent negatives include no chest pain, myalgias or shortness of breath. Atrial Fibrillation  Presents for follow-up visit. Symptoms include hypertension. Symptoms are negative for chest pain, palpitations, shortness of breath, syncope, tachycardia and weakness. Past medical history includes hyperlipidemia. Review of Systems   Constitutional:  Negative for activity change, appetite change, fatigue and unexpected weight change. HENT:  Negative for congestion, ear pain and sinus pain. Respiratory:  Negative for cough, chest tightness, shortness of breath and wheezing. Cardiovascular:  Negative for chest pain, palpitations and syncope. Gastrointestinal:  Negative for abdominal pain, blood in stool and constipation. Endocrine: Negative for cold intolerance, heat intolerance and polyuria. Genitourinary:  Negative for dysuria, frequency and urgency. Musculoskeletal:  Negative for arthralgias and myalgias. Skin:  Negative for color change and rash. Neurological:  Negative for weakness and headaches. Hematological:  Negative for adenopathy. Does not bruise/bleed easily. Psychiatric/Behavioral:  Negative for agitation, dysphoric mood and sleep disturbance. Objective   Physical Exam  Vitals and nursing note reviewed. Constitutional:       General: He is not in acute distress. Appearance: Normal appearance. HENT:      Head: Normocephalic and atraumatic. Right Ear: Tympanic membrane normal.      Left Ear: Tympanic membrane normal.      Nose: Nose normal.   Eyes:      Extraocular Movements: Extraocular movements intact. Conjunctiva/sclera: Conjunctivae normal.      Pupils: Pupils are equal, round, and reactive to light. Neck:      Vascular: No carotid bruit. Cardiovascular:      Rate and Rhythm: Normal rate and regular rhythm. Pulses: Normal pulses. Heart sounds: No murmur heard. Pulmonary:      Effort: Pulmonary effort is normal. No respiratory distress. Breath sounds: Normal breath sounds. Abdominal:      General: Abdomen is flat. Bowel sounds are normal. There is no distension. Palpations: Abdomen is soft. Tenderness: There is no abdominal tenderness. Musculoskeletal:         General: No swelling, tenderness or deformity. Cervical back: Normal range of motion and neck supple. No rigidity or tenderness. Right lower leg: No edema. Left lower leg: No edema. Lymphadenopathy:      Cervical: No cervical adenopathy. Skin:     Coloration: Skin is not jaundiced. Findings: No bruising, erythema or lesion. Neurological:      General: No focal deficit present. Mental Status: He is alert and oriented to person, place, and time. Cranial Nerves: No cranial nerve deficit. Motor: No weakness. Gait: Gait normal.          This dictation was generated by voice recognition computer software. Although all attempts are made to edit the dictation for accuracy, there may be errors in the transcription that are not intended. An electronic signature was used to authenticate this note.     --Jorge Bell MD

## 2022-12-23 LAB
ESTIMATED AVERAGE GLUCOSE: 96.8 MG/DL
HBA1C MFR BLD: 5 %

## 2023-01-12 ENCOUNTER — TELEMEDICINE (OUTPATIENT)
Dept: INTERNAL MEDICINE CLINIC | Age: 73
End: 2023-01-12
Payer: MEDICARE

## 2023-01-12 DIAGNOSIS — Z00.00 INITIAL MEDICARE ANNUAL WELLNESS VISIT: Primary | ICD-10-CM

## 2023-01-12 DIAGNOSIS — Z00.00 MEDICARE ANNUAL WELLNESS VISIT, SUBSEQUENT: ICD-10-CM

## 2023-01-12 PROCEDURE — G0439 PPPS, SUBSEQ VISIT: HCPCS | Performed by: INTERNAL MEDICINE

## 2023-01-12 PROCEDURE — 3017F COLORECTAL CA SCREEN DOC REV: CPT | Performed by: INTERNAL MEDICINE

## 2023-01-12 PROCEDURE — 1123F ACP DISCUSS/DSCN MKR DOCD: CPT | Performed by: INTERNAL MEDICINE

## 2023-01-12 PROCEDURE — G8484 FLU IMMUNIZE NO ADMIN: HCPCS | Performed by: INTERNAL MEDICINE

## 2023-01-12 RX ORDER — POTASSIUM CITRATE 10 MEQ/1
TABLET, EXTENDED RELEASE ORAL
COMMUNITY

## 2023-01-12 ASSESSMENT — PATIENT HEALTH QUESTIONNAIRE - PHQ9
SUM OF ALL RESPONSES TO PHQ QUESTIONS 1-9: 1
SUM OF ALL RESPONSES TO PHQ9 QUESTIONS 1 & 2: 1
SUM OF ALL RESPONSES TO PHQ QUESTIONS 1-9: 1
1. LITTLE INTEREST OR PLEASURE IN DOING THINGS: 0
SUM OF ALL RESPONSES TO PHQ QUESTIONS 1-9: 1
2. FEELING DOWN, DEPRESSED OR HOPELESS: 1
SUM OF ALL RESPONSES TO PHQ QUESTIONS 1-9: 1

## 2023-01-12 ASSESSMENT — LIFESTYLE VARIABLES
HOW MANY STANDARD DRINKS CONTAINING ALCOHOL DO YOU HAVE ON A TYPICAL DAY: PATIENT DOES NOT DRINK
HOW OFTEN DO YOU HAVE A DRINK CONTAINING ALCOHOL: NEVER

## 2023-01-12 NOTE — PROGRESS NOTES
Medicare Annual Wellness Visit    Corie Mcguire is here for Medicare AWV    Assessment & Plan   Initial Medicare annual wellness visit      Recommendations for Preventive Services Due: see orders and patient instructions/AVS.  Recommended screening schedule for the next 5-10 years is provided to the patient in written form: see Patient Instructions/AVS.     No follow-ups on file. Subjective   The following acute and/or chronic problems were also addressed today:  Discussed healthcare gaps with patient. Patient will consider getting Shingles and Pneumonia vaccines, but declines to get flu or Covid vaccines. Patient stated he has a colonoscopy 8 years ago, unable to view visit. Patient did complain today of continued side/back pain from last PCP visit. Patient stated he has some pain when he eats as well not sure if it is from issue with side pain or if from digestive problems. Encouraged to monitor and follow up with PCP if concerned. Patient's complete Health Risk Assessment and screening values have been reviewed and are found in Flowsheets. The following problems were reviewed today and where indicated follow up appointments were made and/or referrals ordered. Positive Risk Factor Screenings with Interventions:               General HRA Questions:  Select all that apply: (!) New or Increased Pain (side pain is hurting more towards stomach back)    Pain Interventions:  Patient encouraged to continue with exercises given by PCP and to do them daily. If no change patient should schedule follow up.         Weight and Activity:  Physical Activity: Sufficiently Active    Days of Exercise per Week: 3 days    Minutes of Exercise per Session: 60 min     On average, how many days per week do you engage in moderate to strenuous exercise (like a brisk walk)?: 3 days  Have you lost any weight without trying in the past 3 months?: No           Dentist Screen:  Have you seen the dentist within the past year?: (!) No    Intervention:  Advised to schedule with their dentist     Vision Screen:  Do you have difficulty driving, watching TV, or doing any of your daily activities because of your eyesight?: No  Have you had an eye exam within the past year?: (!) No  No results found. Interventions:   Patient encouraged to make appointment with their eye specialist    Safety:  Do you have either shower bars, grab bars, non-slip mats or non-slip surfaces in your shower or bathtub?: (!) No  Interventions:  Patient comments: agreeable to get non slip mat for bath/shower to help with safety. Advanced Directives:  Do you have a Living Will?: (!) No    Intervention:  has NO advanced directive - information provided, information mailed to patient. Objective      Patient-Reported Vitals  Patient-Reported Systolic (Top): 019 mmHg  Patient-Reported Diastolic (Bottom): 82 mmHg  Patient-Reported Pulse: 55  Patient-Reported Weight: 170lb              Allergies   Allergen Reactions    Phenergan [Promethazine]      Sees words and numbers     Prior to Visit Medications    Medication Sig Taking?  Authorizing Provider   potassium citrate (UROCIT-K) 10 MEQ (1080 MG) extended release tablet Take by mouth 3 times daily (with meals) 99mg Yes Historical Provider, MD   clopidogrel (PLAVIX) 75 MG tablet Take 75 mg by mouth daily Yes Historical Provider, MD   aspirin 81 MG EC tablet Take 81 mg by mouth daily as needed Yes Historical Provider, MD   metoprolol tartrate (LOPRESSOR) 25 MG tablet 1 tablet in the morning half a tablet in the afternoon Yes Historical Provider, MD   propafenone (RYTHMOL SR) 225 MG extended release capsule Take 1 capsule by mouth 2 times daily Yes Gretchen Carreno MD   ZINC PO Take by mouth Yes Historical Provider, MD   Cholecalciferol (VITAMIN D3 MAXIMUM STRENGTH PO) Take by mouth Yes Historical Provider, MD   MAGNESIUM OXIDE PO Take by mouth Yes Historical Provider, MD   Coenzyme Q10 (COQ10 PO) Take by mouth Yes Historical Provider, MD   Probiotic Product (PROBIOTIC DAILY PO) Take by mouth Yes Historical Provider, MD   Psyllium (METAMUCIL FIBER PO) Take by mouth Yes Historical Provider, MD   MULTI-DIGESTIVE ENZYMES PO Take 1 capsule by mouth 3 times daily as needed Yes Historical Provider, MD   magnesium lactate (MAG-TAB CR) 84 MG (7MEQ) TBCR extended release tablet Take 210 mg by mouth daily Yes Historical Provider, MD   Multiple Vitamins-Minerals (MULTIVITAMIN ADULT PO) Take 1 tablet by mouth daily  Yes Historical Provider, MD   Omega-3 Fatty Acids (OMEGA 3 PO) Take 1 tablet by mouth daily   Patient not taking: No sig reported  Historical Provider, MD       CareTeam (Including outside providers/suppliers regularly involved in providing care):   Patient Care Team:  Mitzy Serna MD as PCP - General (Internal Medicine)  Mitzy Serna MD as PCP [facility]  Gabriella More MD as Consulting Physician (Vascular Surgery)  Jaskaran Baltazar MD as Consulting Physician (Electrophysiology)     Reviewed and updated this visit:  Tobacco  Allergies  Meds  Med Hx  Surg Hx  Soc Hx  Fam Hx        I, Malena Cronin RN, 1/12/2023, performed the documented evaluation under the direct supervision of the attending physician. Nabil Murcia, was evaluated through a synchronous (real-time) audio encounter. The patient (or guardian if applicable) is aware that this is a billable service, which includes applicable co-pays. This Virtual Visit was conducted with patient's (and/or legal guardian's) consent. The visit was conducted pursuant to the emergency declaration under the [facility], [address] [place] [address] authority and the [employer] Act. Patient identification was verified, and a caregiver was present when appropriate. The patient was located at Home: [address] [place] [address].    Provider was located at Facility (Appt Dept): [address],  [address].

## 2023-01-12 NOTE — PATIENT INSTRUCTIONS
Personalized Preventive Plan for Mohinder Pimentel - 1/12/2023  Medicare offers a range of preventive health benefits. Some of the tests and screenings are paid in full while other may be subject to a deductible, co-insurance, and/or copay. Some of these benefits include a comprehensive review of your medical history including lifestyle, illnesses that may run in your family, and various assessments and screenings as appropriate. After reviewing your medical record and screening and assessments performed today your provider may have ordered immunizations, labs, imaging, and/or referrals for you. A list of these orders (if applicable) as well as your Preventive Care list are included within your After Visit Summary for your review. Other Preventive Recommendations:    A preventive eye exam performed by an eye specialist is recommended every 1-2 years to screen for glaucoma; cataracts, macular degeneration, and other eye disorders. A preventive dental visit is recommended every 6 months. Try to get at least 150 minutes of exercise per week or 10,000 steps per day on a pedometer . Order or download the FREE \"Exercise & Physical Activity: Your Everyday Guide\" from [employer]. Call 4-703.540.9038 or search [employer] online. You need 3654-8492 mg of calcium and 0972-9329 IU of vitamin D per day. It is possible to meet your calcium requirement with diet alone, but a vitamin D supplement is usually necessary to meet this goal.  When exposed to the sun, use a sunscreen that protects against both UVA and UVB radiation with an SPF of 30 or greater. Reapply every 2 to 3 hours or after sweating, drying off with a towel, or swimming. Always wear a seat belt when traveling in a car. Always wear a helmet when riding a bicycle or motorcycle.        Learning About Dental Care for Older Adults  Dental care for older adults: Overview  Dental care for older people is much the same as for younger adults. But older adults do have concerns that younger adults do not. Older adults may have problems with gum disease and decay on the roots of their teeth. They may need missing teeth replaced or broken fillings fixed. Or they may have dentures that need to be cared for. Some older adults may have trouble holding a toothbrush. You can help remind the person you are caring for to brush and floss their teeth or to clean their dentures. In some cases, you may need to do the brushing and other dental care tasks. People who have trouble using their hands or who have dementia may need this extra help. How can you help with dental care? Normal dental care  To keep the teeth and gums healthy:  Brush the teeth with fluoride toothpaste twice a day--in the morning and at night--and floss at least once a day. Plaque can quickly build up on the teeth of older adults. Watch for the signs of gum disease. These signs include gums that bleed after brushing or after eating hard foods, such as apples. See a dentist regularly. Many experts recommend checkups every 6 months. Keep the dentist up to date on any new medications the person is taking. Encourage a balanced diet that includes whole grains, vegetables, and fruits, and that is low in saturated fat and sodium. Encourage the person you're caring for not to use tobacco products. They can affect dental and general health. Many older adults have a fixed income and feel that they can't afford dental care. But most towns and cities have programs in which dentists help older adults by lowering fees. Contact your area's public health offices or  for information about dental care in your area. Using a toothbrush  Older adults with arthritis sometimes have trouble brushing their teeth because they can't easily hold the toothbrush. Their hands and fingers may be stiff, painful, or weak. If this is the case, you can:   Offer an electric toothbrush. Enlarge the handle of a non-electric toothbrush by wrapping a sponge, an elastic bandage, or adhesive tape around it. Push the toothbrush handle through a ball made of rubber or soft foam.  Make the handle longer and thicker by taping Popsicle sticks or tongue depressors to it. You may also be able to buy special toothbrushes, toothpaste dispensers, and floss holders. Your doctor may recommend a soft-bristle toothbrush if the person you care for bleeds easily. Bleeding can happen because of a health problem or from certain medicines. A toothpaste for sensitive teeth may help if the person you care for has sensitive teeth. How do you brush and floss someone's teeth? If the person you are caring for has a hard time cleaning their teeth on their own, you may need to brush and floss their teeth for them. It may be easiest to have the person sit and face away from you, and to sit or stand behind them. That way you can steady their head against your arm as you reach around to floss and brush their teeth. Choose a place that has good lighting and is comfortable for both of you. Before you begin, gather your supplies. You will need gloves, floss, a toothbrush, and a container to hold water if you are not near a sink. Wash and dry your hands well and put on gloves. Start by flossing:  Gently work a piece of floss between each of the teeth toward the gums. A plastic flossing tool may make this easier, and they are available at most [facility]. Curve the floss around each tooth into a U-shape and gently slide it under the gum line. Move the floss firmly up and down several times to scrape off the plaque. After you've finished flossing, throw away the used floss and begin brushing:  Wet the brush and apply toothpaste. Place the brush at a 45-degree angle where the teeth meet the gums. Press firmly, and move the brush in small circles over the surface of the teeth. Be careful not to brush too hard. Vigorous brushing can make the gums pull away from the teeth and can scratch the tooth enamel. Brush all surfaces of the teeth, on the tongue side and on the cheek side. Pay special attention to the front teeth and all surfaces of the back teeth. Brush chewing surfaces with short back-and-forth strokes. After you've finished, help the person rinse the remaining toothpaste from their mouth. Where can you learn more? Go to http://www.woods.com/ and enter F944 to learn more about \"Learning About Dental Care for Older Adults. \"  Current as of: June 16, 2022               Content Version: 13.5  © 1345-6187 [employer]. Care instructions adapted under license by [facility] [address] If you have questions about a medical condition or this instruction, always ask your healthcare professional. [place] [address] any warranty or liability for your use of this information. Learning About Vision Tests  What are vision tests? The four most common vision tests are visual acuity tests, refraction, visual field tests, and color vision tests. Visual acuity (sharpness) tests  These tests are used: To see if you need glasses or contact lenses. To monitor an eye problem. To check an eye injury. Visual acuity tests are done as part of routine exams. You may also have this test when you get your 's license or apply for some types of jobs. Visual field tests  These tests are used: To check for vision loss in any area of your range of vision. To screen for certain eye diseases. To look for nerve damage after a stroke, head injury, or other problem that could reduce blood flow to the brain. Refraction and color tests  A refraction test is done to find the right prescription for glasses and contact lenses. A color vision test is done to check for color blindness.   Color vision is often tested as part of a routine exam. You may also have this test when you apply for a job where recognizing different colors is important, such as , electronics, or the [employer]. How are vision tests done? Visual acuity test   You cover one eye at a time. You read aloud from a wall chart across the room. You read aloud from a small card that you hold in your hand. Refraction   You look into a special device. The device puts lenses of different strengths in front of each eye to see how strong your glasses or contact lenses need to be. Visual field tests   Your doctor may have you look through special machines. Or your doctor may simply have you stare straight ahead while they move a finger into and out of your field of vision. Color vision test   You look at pieces of printed test patterns in various colors. You say what number or symbol you see. Your doctor may have you trace the number or symbol using a pointer. How do these tests feel? There is very little chance of having a problem from this test. If dilating drops are used for a vision test, they may make the eyes sting and cause a medicine taste in the mouth. Follow-up care is a key part of your treatment and safety. Be sure to make and go to all appointments, and call your doctor if you are having problems. It's also a good idea to know your test results and keep a list of the medicines you take. Where can you learn more? Go to http://www.slaughter.com/ and enter G551 to learn more about \"Learning About Vision Tests. \"  Current as of: October 12, 2022               Content Version: 13.5  © 5044-2190 [employer]. Care instructions adapted under license by [facility] ([facility]). If you have questions about a medical condition or this instruction, always ask your healthcare professional. [place] [address] any warranty or liability for your use of this information.            Advance Directives: Care Instructions  Overview  An advance directive is a legal way to state your wishes at the end of your life. It tells your family and your doctor what to do if you can't say what you want. There are two main types of advance directives. You can change them any time your wishes change. Living will. This form tells your family and your doctor your wishes about life support and other treatment. The form is also called a declaration. Medical power of . This form lets you name a person to make treatment decisions for you when you can't speak for yourself. This person is called a health care agent (health care proxy, health care surrogate). The form is also called a durable power of  for health care. If you do not have an advance directive, decisions about your medical care may be made by a family member, or by a doctor or a  who doesn't know you. It may help to think of an advance directive as a gift to the people who care for you. If you have one, they won't have to make tough decisions by themselves. For more information, including forms for your state, see the [address] [facility] website (www.caringinfo.org/planning/advance-directives/). Follow-up care is a key part of your treatment and safety. Be sure to make and go to all appointments, and call your doctor if you are having problems. It's also a good idea to know your test results and keep a list of the medicines you take. What should you include in an advance directive? Many states have a unique advance directive form. (It may ask you to address specific issues.) Or you might use a universal form that's approved by many states. If your form doesn't tell you what to address, it may be hard to know what to include in your advance directive. Use the questions below to help you get started. Who do you want to make decisions about your medical care if you are not able to? What life-support measures do you want if you have a serious illness that gets worse over time or can't be cured? What are you most afraid of that might happen? (Maybe you're afraid of having pain, losing your independence, or being kept alive by machines.)  Where would you prefer to die? (Your home? A hospital? A nursing home?)  Do you want to donate your organs when you die? Do you want certain [employer] practices performed before you die? When should you call for help? Be sure to contact your doctor if you have any questions. Where can you learn more? Go to http://www.slaughter.com/ and enter R264 to learn more about \"Advance Directives: Care Instructions. \"  Current as of: June 16, 2022               Content Version: 13.5  © 9921-1607 [employer], [employer]. Care instructions adapted under license by [facility] [place] ([facility]). If you have questions about a medical condition or this instruction, always ask your healthcare professional. [address] any warranty or liability for your use of this information. Personalized Preventive Plan for Mohinder Pimentel - 1/12/2023  Medicare offers a range of preventive health benefits. Some of the tests and screenings are paid in full while other may be subject to a deductible, co-insurance, and/or copay. Some of these benefits include a comprehensive review of your medical history including lifestyle, illnesses that may run in your family, and various assessments and screenings as appropriate. After reviewing your medical record and screening and assessments performed today your provider may have ordered immunizations, labs, imaging, and/or referrals for you. A list of these orders (if applicable) as well as your Preventive Care list are included within your After Visit Summary for your review. Other Preventive Recommendations:    A preventive eye exam performed by an eye specialist is recommended every 1-2 years to screen for glaucoma; cataracts, macular degeneration, and other eye disorders. A preventive dental visit is recommended every 6 months.   Try to get at least 150 minutes of exercise per week or 10,000 steps per day on a pedometer . Order or download the FREE \"Exercise & Physical Activity: Your Everyday Guide\" from The [employer]. Call 0-962.161.3329 or search The [employer] online. You need 0965-1495 mg of calcium and 7643-0384 IU of vitamin D per day. It is possible to meet your calcium requirement with diet alone, but a vitamin D supplement is usually necessary to meet this goal.  When exposed to the sun, use a sunscreen that protects against both UVA and UVB radiation with an SPF of 30 or greater. Reapply every 2 to 3 hours or after sweating, drying off with a towel, or swimming. Always wear a seat belt when traveling in a car. Always wear a helmet when riding a bicycle or motorcycle.

## 2023-01-13 NOTE — PROGRESS NOTES
Reviewed the patient annual Medicare wellness assessment at this point patient seems to be doing fairly well he he does need to establish advance care plan and will discuss that with detail next visit

## 2023-03-21 SDOH — ECONOMIC STABILITY: INCOME INSECURITY: HOW HARD IS IT FOR YOU TO PAY FOR THE VERY BASICS LIKE FOOD, HOUSING, MEDICAL CARE, AND HEATING?: NOT VERY HARD

## 2023-03-21 SDOH — ECONOMIC STABILITY: TRANSPORTATION INSECURITY
IN THE PAST 12 MONTHS, HAS LACK OF TRANSPORTATION KEPT YOU FROM MEETINGS, WORK, OR FROM GETTING THINGS NEEDED FOR DAILY LIVING?: NO

## 2023-03-21 SDOH — ECONOMIC STABILITY: FOOD INSECURITY: WITHIN THE PAST 12 MONTHS, THE FOOD YOU BOUGHT JUST DIDN'T LAST AND YOU DIDN'T HAVE MONEY TO GET MORE.: NEVER TRUE

## 2023-03-21 SDOH — ECONOMIC STABILITY: FOOD INSECURITY: WITHIN THE PAST 12 MONTHS, YOU WORRIED THAT YOUR FOOD WOULD RUN OUT BEFORE YOU GOT MONEY TO BUY MORE.: NEVER TRUE

## 2023-03-21 SDOH — ECONOMIC STABILITY: HOUSING INSECURITY
IN THE LAST 12 MONTHS, WAS THERE A TIME WHEN YOU DID NOT HAVE A STEADY PLACE TO SLEEP OR SLEPT IN A SHELTER (INCLUDING NOW)?: NO

## 2023-03-22 ENCOUNTER — OFFICE VISIT (OUTPATIENT)
Dept: INTERNAL MEDICINE CLINIC | Age: 73
End: 2023-03-22

## 2023-03-22 VITALS
WEIGHT: 173.2 LBS | HEIGHT: 68 IN | OXYGEN SATURATION: 97 % | HEART RATE: 63 BPM | BODY MASS INDEX: 26.25 KG/M2 | SYSTOLIC BLOOD PRESSURE: 128 MMHG | DIASTOLIC BLOOD PRESSURE: 84 MMHG

## 2023-03-22 DIAGNOSIS — N20.0 RENAL STONES: ICD-10-CM

## 2023-03-22 DIAGNOSIS — I48.0 PAF (PAROXYSMAL ATRIAL FIBRILLATION) (HCC): ICD-10-CM

## 2023-03-22 DIAGNOSIS — Z00.00 PREVENTATIVE HEALTH CARE: ICD-10-CM

## 2023-03-22 DIAGNOSIS — E78.49 OTHER HYPERLIPIDEMIA: Primary | ICD-10-CM

## 2023-03-22 DIAGNOSIS — I72.8 SPLENIC ARTERY ANEURYSM (HCC): ICD-10-CM

## 2023-03-22 DIAGNOSIS — I10 PRIMARY HYPERTENSION: ICD-10-CM

## 2023-03-22 SDOH — ECONOMIC STABILITY: FOOD INSECURITY: WITHIN THE PAST 12 MONTHS, THE FOOD YOU BOUGHT JUST DIDN'T LAST AND YOU DIDN'T HAVE MONEY TO GET MORE.: NEVER TRUE

## 2023-03-22 SDOH — ECONOMIC STABILITY: INCOME INSECURITY: HOW HARD IS IT FOR YOU TO PAY FOR THE VERY BASICS LIKE FOOD, HOUSING, MEDICAL CARE, AND HEATING?: NOT HARD AT ALL

## 2023-03-22 SDOH — ECONOMIC STABILITY: FOOD INSECURITY: WITHIN THE PAST 12 MONTHS, YOU WORRIED THAT YOUR FOOD WOULD RUN OUT BEFORE YOU GOT MONEY TO BUY MORE.: NEVER TRUE

## 2023-03-22 ASSESSMENT — ENCOUNTER SYMPTOMS
WHEEZING: 0
SHORTNESS OF BREATH: 0
COLOR CHANGE: 0
SINUS PAIN: 0
BLOOD IN STOOL: 0
CONSTIPATION: 0
COUGH: 0
ABDOMINAL PAIN: 0
CHEST TIGHTNESS: 0

## 2023-03-22 NOTE — PROGRESS NOTES
Eula Gamino (:  1950) is a 71 y.o. male,Established patient, here for evaluation of the following chief complaint(s):  3 Month Follow-Up         ASSESSMENT/PLAN:  1. Other hyperlipidemia  -     Comprehensive Metabolic Panel; Future  -     Lipid Panel; Future  2. Splenic artery aneurysm ([facility] [address]  3. PAF (paroxysmal atrial fibrillation) ([facility] [address]  4. Primary hypertension  -     Comprehensive Metabolic Panel; Future  -     CBC with Auto Differential; Future  5. Renal stones  6. Preventative health care  -     Hemoglobin A1C; Future  Patient is coming for lab results that the patient does have a lot of arthritis in the back he needs to continue to work on it with exercise    The patient does have kidney stones we discussed different options for treatment he is following up with his urologist as well    Patient blood pressure is controlled I did repeat it after he rested and it came down even further so we will continue to monitor clinically for the time being    I reviewed the patient stress test that he done 10 days ago at [facility]        Return in about 6 months (around 2023).          Subjective   SUBJECTIVE/OBJECTIVE:    Lab Review   Lab Results   Component Value Date/Time     2022 11:58 AM     2018 11:56 AM     2018 06:42 AM    K 4.2 2022 11:58 AM    K 4.2 2018 11:56 AM    K 4.7 2018 06:42 AM    K 4.2 2018 11:39 AM    CO2 27 2022 11:58 AM    CO2 24 2018 11:56 AM    CO2 20 2018 06:42 AM    BUN 14 2022 11:58 AM    BUN 8 2018 11:56 AM    BUN 17 2018 06:42 AM    CREATININE 1.0 2022 11:58 AM    CREATININE 0.9 2018 11:56 AM    CREATININE 1.4 2018 06:42 AM    GLUCOSE 95 2022 11:58 AM    GLUCOSE 101 2018 11:56 AM    GLUCOSE 160 2018 06:42 AM    CALCIUM 9.7 2022 11:58 AM    CALCIUM 9.3 2018 11:56 AM    CALCIUM 7.7 2018 06:42 AM     Lab Results   Component Value

## 2023-06-13 NOTE — H&P
The history and physical was reviewed. The patient was seen and examined in pre-op and his LEFT side was marked with his participation. He had a chance to ask questions which were answered. There has been no interval change. Plan to continue to the OR for ureteroscopy.     Lungs, no audible respiratory sounds  CV RRR    Meghana Wolfe  7/31/2018 · H/o gastric ulcer   · Hgb 9 6 today  · Received IV iron x1 yesterday  · GI following  · S/p EGD/[place] revealing persistent bleeding based ulcer in gastric body, possibly smaller compared to prior pictures  Small hiatal hernia  Normal duodenum and esophagus  Biopsies taken of ulcer  Colonoscopy with diverticulosis and small hemorrhoids, no polyps or malignancies  · GI recommends resumption of regular diet and increasing Protonix to twice a day  · Follow-up in the office in 2 months  Repeat EGD advised in 3 months to document complete ulcer healing

## 2023-09-14 DIAGNOSIS — E78.49 OTHER HYPERLIPIDEMIA: ICD-10-CM

## 2023-09-14 DIAGNOSIS — I10 PRIMARY HYPERTENSION: ICD-10-CM

## 2023-09-14 DIAGNOSIS — Z00.00 PREVENTATIVE HEALTH CARE: ICD-10-CM

## 2023-09-14 LAB
ALBUMIN SERPL-MCNC: 4.5 G/DL (ref 3.4–5)
ALBUMIN/GLOB SERPL: 1.8 {RATIO} (ref 1.1–2.2)
ALP SERPL-CCNC: 95 U/L (ref 40–129)
ALT SERPL-CCNC: 24 U/L (ref 10–40)
ANION GAP SERPL CALCULATED.3IONS-SCNC: 9 MMOL/L (ref 3–16)
AST SERPL-CCNC: 19 U/L (ref 15–37)
BASOPHILS # BLD: 0.1 K/UL (ref 0–0.2)
BASOPHILS NFR BLD: 0.4 %
BILIRUB SERPL-MCNC: 0.6 MG/DL (ref 0–1)
BUN SERPL-MCNC: 12 MG/DL (ref 7–20)
CALCIUM SERPL-MCNC: 9.2 MG/DL (ref 8.3–10.6)
CHLORIDE SERPL-SCNC: 106 MMOL/L (ref 99–110)
CHOLEST SERPL-MCNC: 102 MG/DL (ref 0–199)
CO2 SERPL-SCNC: 28 MMOL/L (ref 21–32)
CREAT SERPL-MCNC: 1 MG/DL (ref 0.8–1.3)
DEPRECATED RDW RBC AUTO: 13.1 % (ref 12.4–15.4)
EOSINOPHIL # BLD: 0.3 K/UL (ref 0–0.6)
EOSINOPHIL NFR BLD: 2.5 %
GFR SERPLBLD CREATININE-BSD FMLA CKD-EPI: >60 ML/MIN/{1.73_M2}
GLUCOSE SERPL-MCNC: 94 MG/DL (ref 70–99)
HCT VFR BLD AUTO: 44.9 % (ref 40.5–52.5)
HDLC SERPL-MCNC: 30 MG/DL (ref 40–60)
HGB BLD-MCNC: 15.3 G/DL (ref 13.5–17.5)
LDLC SERPL CALC-MCNC: 42 MG/DL
LYMPHOCYTES # BLD: 3.7 K/UL (ref 1–5.1)
LYMPHOCYTES NFR BLD: 29.4 %
MCH RBC QN AUTO: 30.6 PG (ref 26–34)
MCHC RBC AUTO-ENTMCNC: 34.2 G/DL (ref 31–36)
MCV RBC AUTO: 89.7 FL (ref 80–100)
MONOCYTES # BLD: 1 K/UL (ref 0–1.3)
MONOCYTES NFR BLD: 7.9 %
NEUTROPHILS # BLD: 7.6 K/UL (ref 1.7–7.7)
NEUTROPHILS NFR BLD: 59.8 %
PLATELET # BLD AUTO: 257 K/UL (ref 135–450)
PMV BLD AUTO: 8.8 FL (ref 5–10.5)
POTASSIUM SERPL-SCNC: 4.1 MMOL/L (ref 3.5–5.1)
PROT SERPL-MCNC: 7 G/DL (ref 6.4–8.2)
RBC # BLD AUTO: 5.01 M/UL (ref 4.2–5.9)
SODIUM SERPL-SCNC: 143 MMOL/L (ref 136–145)
TRIGL SERPL-MCNC: 152 MG/DL (ref 0–150)
VLDLC SERPL CALC-MCNC: 30 MG/DL
WBC # BLD AUTO: 12.7 K/UL (ref 4–11)

## 2023-09-15 LAB
EST. AVERAGE GLUCOSE BLD GHB EST-MCNC: 99.7 MG/DL
HBA1C MFR BLD: 5.1 %

## 2023-09-15 NOTE — RESULT ENCOUNTER NOTE
Please inform that the patient's results are acceptable and we will discuss it further during the visit

## 2023-09-21 ENCOUNTER — OFFICE VISIT (OUTPATIENT)
Dept: INTERNAL MEDICINE CLINIC | Age: 73
End: 2023-09-21

## 2023-09-21 VITALS — HEART RATE: 48 BPM | OXYGEN SATURATION: 100 % | SYSTOLIC BLOOD PRESSURE: 130 MMHG | DIASTOLIC BLOOD PRESSURE: 80 MMHG

## 2023-09-21 DIAGNOSIS — E78.49 OTHER HYPERLIPIDEMIA: ICD-10-CM

## 2023-09-21 DIAGNOSIS — N40.1 BENIGN PROSTATIC HYPERPLASIA WITH URINARY FREQUENCY: ICD-10-CM

## 2023-09-21 DIAGNOSIS — N20.0 RENAL STONES: ICD-10-CM

## 2023-09-21 DIAGNOSIS — I10 PRIMARY HYPERTENSION: Primary | ICD-10-CM

## 2023-09-21 DIAGNOSIS — K21.9 GASTROESOPHAGEAL REFLUX DISEASE WITHOUT ESOPHAGITIS: ICD-10-CM

## 2023-09-21 DIAGNOSIS — I10 PRIMARY HYPERTENSION: ICD-10-CM

## 2023-09-21 DIAGNOSIS — R35.0 BENIGN PROSTATIC HYPERPLASIA WITH URINARY FREQUENCY: ICD-10-CM

## 2023-09-21 LAB
BACTERIA URNS QL MICRO: ABNORMAL /HPF
BILIRUB UR QL STRIP.AUTO: NEGATIVE
CLARITY UR: CLEAR
COLOR UR: YELLOW
EPI CELLS #/AREA URNS AUTO: 2 /HPF (ref 0–5)
GLUCOSE UR STRIP.AUTO-MCNC: NEGATIVE MG/DL
HGB UR QL STRIP.AUTO: ABNORMAL
HYALINE CASTS #/AREA URNS AUTO: 0 /LPF (ref 0–8)
KETONES UR STRIP.AUTO-MCNC: NEGATIVE MG/DL
LEUKOCYTE ESTERASE UR QL STRIP.AUTO: ABNORMAL
NITRITE UR QL STRIP.AUTO: NEGATIVE
PH UR STRIP.AUTO: 6 [PH] (ref 5–8)
PROT UR STRIP.AUTO-MCNC: NEGATIVE MG/DL
RBC CLUMPS #/AREA URNS AUTO: 5 /HPF (ref 0–4)
SP GR UR STRIP.AUTO: 1.01 (ref 1–1.03)
UA DIPSTICK W REFLEX MICRO PNL UR: YES
URN SPEC COLLECT METH UR: ABNORMAL
UROBILINOGEN UR STRIP-ACNC: 0.2 E.U./DL
WBC #/AREA URNS AUTO: 14 /HPF (ref 0–5)

## 2023-09-21 RX ORDER — PANTOPRAZOLE SODIUM 40 MG/1
40 TABLET, DELAYED RELEASE ORAL
Qty: 30 TABLET | Refills: 0 | Status: SHIPPED | OUTPATIENT
Start: 2023-09-21

## 2023-09-21 RX ORDER — TRAMADOL HYDROCHLORIDE 50 MG/1
50 TABLET ORAL EVERY 6 HOURS PRN
Qty: 15 TABLET | Refills: 0 | Status: SHIPPED | OUTPATIENT
Start: 2023-09-21 | End: 2023-09-28

## 2023-09-21 ASSESSMENT — ENCOUNTER SYMPTOMS
ABDOMINAL PAIN: 1
SHORTNESS OF BREATH: 0

## 2023-09-21 NOTE — PROGRESS NOTES
Nubia Nichols (:  1950) is a 68 y.o. male,Established patient, here for evaluation of the following chief complaint(s):  Abdominal Pain (Right side,since last visit. )         ASSESSMENT/PLAN:  1. Primary hypertension  -     TSH; Future  -     CBC with Auto Differential; Future  -     Comprehensive Metabolic Panel; Future  2. Gastroesophageal reflux disease without esophagitis  -     AFL - Greyson Sneed MD, Gastroenterology, [facility]  -     H. Pylori Breath Test, Adult; Future  -     pantoprazole (PROTONIX) 40 MG tablet; Take 1 tablet by mouth every morning (before breakfast), Disp-30 tablet, R-0Normal  3. Renal stones  -     Urinalysis with Microscopic; Future  4. Other hyperlipidemia  -     Lipid Panel; Future  5.  Benign prostatic hyperplasia with urinary frequency  Reviewed with the patient his lab findings he does have elevated white cell count which she had about a year ago the patient does have history of kidney stones and it may be acting up at this point we will check his urinalysis have him see his urologist which she is due to see in a week or so if his numbers is continuously elevated after that then further evaluation should follow to exclude other etiologies for the leukocytosis    Patient is having also some abdominal discomfort especially after he eats where he is feeling bloated and uncomfortable the patient did have history of duodenal diverticulum at this point we will start with a therapeutic trial with Protonix for the next 4 to 8 weeks if he does not improve an upper endoscopy would be the next step meanwhile he is referred to have a screening colonoscopy and if his symptoms are still ongoing by that time he should probably have both at the same time    The patient will probably also need a CAT scan of the abdomen but given that he probably will have it as part of his evaluation for the stone I would defer doing that for the time being we will also check him for H. pylori today

## 2023-09-22 ENCOUNTER — TELEPHONE (OUTPATIENT)
Dept: INTERNAL MEDICINE CLINIC | Age: 73
End: 2023-09-22

## 2023-09-22 RX ORDER — SULFAMETHOXAZOLE AND TRIMETHOPRIM 800; 160 MG/1; MG/1
1 TABLET ORAL 2 TIMES DAILY
Qty: 10 TABLET | Refills: 0 | Status: SHIPPED | OUTPATIENT
Start: 2023-09-22 | End: 2023-09-27

## 2023-09-22 NOTE — TELEPHONE ENCOUNTER
Please advise patient that I did look at the urine test results they are borderline he has a few white cells but it is nitrate positive so if he is having symptoms like frequency or burning I would go ahead and start the antibiotic that was sent to his pharmacy today

## 2023-09-22 NOTE — TELEPHONE ENCOUNTER
Pt's wife called in regards to a urine test that was completed yesterday. She states that she can see the abnormal results online and was wondering if Dr. Deb Zamora could take a look at this in case it's a UTI so he can get started on antibiotics. Please advise.

## 2023-09-23 LAB — H PYLORI BREATH TEST: NEGATIVE

## 2023-09-26 ENCOUNTER — HOSPITAL ENCOUNTER (OUTPATIENT)
Dept: CT IMAGING | Age: 73
Discharge: HOME | End: 2023-09-26
Attending: UROLOGY
Payer: MEDICARE

## 2023-09-26 DIAGNOSIS — N20.0 CALCULUS, KIDNEY: ICD-10-CM

## 2023-09-26 DIAGNOSIS — R31.21 ASYMPTOMATIC MICROSCOPIC HEMATURIA: ICD-10-CM

## 2023-09-26 PROCEDURE — 74176 CT ABD & PELVIS W/O CONTRAST: CPT

## 2023-12-09 ENCOUNTER — HOSPITAL ENCOUNTER (EMERGENCY)
Age: 73
Discharge: HOME | End: 2023-12-09
Payer: MEDICARE

## 2023-12-09 ENCOUNTER — APPOINTMENT (OUTPATIENT)
Dept: CT IMAGING | Age: 73
End: 2023-12-09
Payer: MEDICARE

## 2023-12-09 VITALS
DIASTOLIC BLOOD PRESSURE: 116 MMHG | RESPIRATION RATE: 16 BRPM | HEART RATE: 72 BPM | TEMPERATURE: 98.5 F | SYSTOLIC BLOOD PRESSURE: 175 MMHG | OXYGEN SATURATION: 98 %

## 2023-12-09 DIAGNOSIS — R31.9 HEMATURIA, UNSPECIFIED TYPE: Primary | ICD-10-CM

## 2023-12-09 LAB
ALBUMIN SERPL-MCNC: 4.5 G/DL (ref 3.4–5)
ALBUMIN/GLOB SERPL: 1.4 {RATIO} (ref 1.1–2.2)
ALP SERPL-CCNC: 101 U/L (ref 40–129)
ALT SERPL-CCNC: 24 U/L (ref 10–40)
ANION GAP SERPL CALCULATED.3IONS-SCNC: 9 MMOL/L (ref 3–16)
AST SERPL-CCNC: 22 U/L (ref 15–37)
BACTERIA URNS QL MICRO: ABNORMAL /HPF
BASOPHILS # BLD: 0.1 K/UL (ref 0–0.2)
BASOPHILS NFR BLD: 0.6 %
BILIRUB SERPL-MCNC: 0.6 MG/DL (ref 0–1)
BILIRUB UR QL STRIP.AUTO: NEGATIVE
BUN SERPL-MCNC: 12 MG/DL (ref 7–20)
CALCIUM SERPL-MCNC: 9.2 MG/DL (ref 8.3–10.6)
CHLORIDE SERPL-SCNC: 106 MMOL/L (ref 99–110)
CLARITY UR: CLEAR
CO2 SERPL-SCNC: 27 MMOL/L (ref 21–32)
COLOR UR: YELLOW
CREAT SERPL-MCNC: 0.8 MG/DL (ref 0.8–1.3)
DEPRECATED RDW RBC AUTO: 13.6 % (ref 12.4–15.4)
EOSINOPHIL # BLD: 0.3 K/UL (ref 0–0.6)
EOSINOPHIL NFR BLD: 2.9 %
EPI CELLS #/AREA URNS AUTO: 1 /HPF (ref 0–5)
GFR SERPLBLD CREATININE-BSD FMLA CKD-EPI: >60 ML/MIN/{1.73_M2}
GLUCOSE SERPL-MCNC: 112 MG/DL (ref 70–99)
GLUCOSE UR STRIP.AUTO-MCNC: NEGATIVE MG/DL
HCT VFR BLD AUTO: 46.7 % (ref 40.5–52.5)
HGB BLD-MCNC: 15.7 G/DL (ref 13.5–17.5)
HGB UR QL STRIP.AUTO: ABNORMAL
HYALINE CASTS #/AREA URNS AUTO: 0 /LPF (ref 0–8)
KETONES UR STRIP.AUTO-MCNC: NEGATIVE MG/DL
LEUKOCYTE ESTERASE UR QL STRIP.AUTO: ABNORMAL
LYMPHOCYTES # BLD: 3.6 K/UL (ref 1–5.1)
LYMPHOCYTES NFR BLD: 35.3 %
MCH RBC QN AUTO: 29.9 PG (ref 26–34)
MCHC RBC AUTO-ENTMCNC: 33.7 G/DL (ref 31–36)
MCV RBC AUTO: 88.7 FL (ref 80–100)
MONOCYTES # BLD: 1 K/UL (ref 0–1.3)
MONOCYTES NFR BLD: 10.1 %
NEUTROPHILS # BLD: 5.3 K/UL (ref 1.7–7.7)
NEUTROPHILS NFR BLD: 51.1 %
NITRITE UR QL STRIP.AUTO: NEGATIVE
PH UR STRIP.AUTO: 5.5 [PH] (ref 5–8)
PLATELET # BLD AUTO: 288 K/UL (ref 135–450)
PMV BLD AUTO: 8.2 FL (ref 5–10.5)
POTASSIUM SERPL-SCNC: 4.3 MMOL/L (ref 3.5–5.1)
PROT SERPL-MCNC: 7.7 G/DL (ref 6.4–8.2)
PROT UR STRIP.AUTO-MCNC: ABNORMAL MG/DL
RBC # BLD AUTO: 5.26 M/UL (ref 4.2–5.9)
RBC CLUMPS #/AREA URNS AUTO: 156 /HPF (ref 0–4)
SODIUM SERPL-SCNC: 142 MMOL/L (ref 136–145)
SP GR UR STRIP.AUTO: <=1.005 (ref 1–1.03)
UA COMPLETE W REFLEX CULTURE PNL UR: ABNORMAL
UA DIPSTICK W REFLEX MICRO PNL UR: YES
URN SPEC COLLECT METH UR: ABNORMAL
UROBILINOGEN UR STRIP-ACNC: 0.2 E.U./DL
WBC # BLD AUTO: 10.3 K/UL (ref 4–11)
WBC #/AREA URNS AUTO: 7 /HPF (ref 0–5)

## 2023-12-09 PROCEDURE — 99284 EMERGENCY DEPT VISIT MOD MDM: CPT

## 2023-12-09 PROCEDURE — 85025 COMPLETE CBC W/AUTO DIFF WBC: CPT

## 2023-12-09 PROCEDURE — 80053 COMPREHEN METABOLIC PANEL: CPT

## 2023-12-09 PROCEDURE — 81001 URINALYSIS AUTO W/SCOPE: CPT

## 2023-12-09 PROCEDURE — 74176 CT ABD & PELVIS W/O CONTRAST: CPT

## 2023-12-09 ASSESSMENT — PAIN DESCRIPTION - DESCRIPTORS: DESCRIPTORS: SHARP

## 2023-12-09 ASSESSMENT — PAIN DESCRIPTION - ONSET: ONSET: ON-GOING

## 2023-12-09 ASSESSMENT — ENCOUNTER SYMPTOMS
CHEST TIGHTNESS: 0
RESPIRATORY NEGATIVE: 1
VOMITING: 0
COLOR CHANGE: 0
ABDOMINAL PAIN: 0
COUGH: 0
NAUSEA: 0
DIARRHEA: 0
BACK PAIN: 0
CONSTIPATION: 0
SHORTNESS OF BREATH: 0

## 2023-12-09 ASSESSMENT — PAIN DESCRIPTION - PAIN TYPE: TYPE: ACUTE PAIN

## 2023-12-09 ASSESSMENT — PAIN DESCRIPTION - FREQUENCY: FREQUENCY: CONTINUOUS

## 2023-12-09 ASSESSMENT — PAIN DESCRIPTION - ORIENTATION: ORIENTATION: RIGHT

## 2023-12-09 ASSESSMENT — PAIN - FUNCTIONAL ASSESSMENT
PAIN_FUNCTIONAL_ASSESSMENT: PREVENTS OR INTERFERES WITH MANY ACTIVE NOT PASSIVE ACTIVITIES
PAIN_FUNCTIONAL_ASSESSMENT: 0-10

## 2023-12-09 ASSESSMENT — PAIN SCALES - GENERAL: PAINLEVEL_OUTOF10: 3

## 2023-12-09 ASSESSMENT — PAIN DESCRIPTION - LOCATION: LOCATION: FLANK

## 2023-12-09 NOTE — ED PROVIDER NOTES
[facility]        Pt Name: Nury Yeboah  MRN: [id number]  [address] 1950  Date of evaluation: 12/9/2023  Provider: KENNA Orozco  PCP: Dain Rubio MD  Note Started: 4:54 PM EST 12/9/23      CL. I have evaluated this patient. CHIEF COMPLAINT       Chief Complaint   Patient presents with    Flank Pain     Right side. Hx kidney stones. HISTORY OF PRESENT ILLNESS: 1 or more Elements     History From: Patient  Limitations to history : None    Nury Yeboah is a 68 y.o. male with past medical history of atrial fibrillation, hyperlipidemia, GERD, BPH and hypertension who presents ED with complaint of flank pain. He states yesterday he had some pain to his right-sided flank. He states today pain has subsided. He states yesterday he had some bright red blood when he urinated. He states that is improved as well today. He states today it seemed more like it was Otto-Aid colored. He denies any dysuria, frequency or urgency. Denies any changes in bowel movements. Denies any nausea or vomiting. Denies chest pain or shortness of breath. No any fever or chills. He is concerned because he states he has a known large kidney stone to his right kidney. He states he is post have an appointment on Tuesday with urology for evaluation. He is anticoagulated on aspirin and Plavix. Nursing Notes were all reviewed and agreed with or any disagreements were addressed in the HPI. REVIEW OF SYSTEMS :      Review of Systems   Constitutional:  Negative for activity change, appetite change, chills, diaphoresis, fatigue and fever. Respiratory: Negative. Negative for cough, chest tightness and shortness of breath. Cardiovascular: Negative. Negative for chest pain, palpitations and leg swelling. Gastrointestinal:  Negative for abdominal pain, constipation, diarrhea, nausea and vomiting.    Genitourinary:  Positive for flank pain and hematuria. Negative for decreased urine volume, difficulty urinating, dysuria, frequency, testicular pain and urgency. Musculoskeletal:  Negative for arthralgias, back pain, myalgias, neck pain and neck stiffness. Skin:  Negative for color change, pallor, rash and wound. Neurological:  Negative for dizziness, light-headedness and headaches. Positives and Pertinent negatives as per HPI.      SURGICAL HISTORY     Past Surgical History:   Procedure Laterality Date    BACK SURGERY      CHOLECYSTECTOMY, LAPAROSCOPIC  12-27-10    with cholangiogram    COLONOSCOPY  10/28/14    CYSTOSCOPY  06/29/2018    flex cysto placement, UPJ catheter placement with L percutaneous  stent    CYSTOSCOPY  07/31/2018    CYSTOSCOPY LEFT STENT REMOVAL, LEFT RETROGRADE PYELOGRAM, LEFT FLEXIBLE URETEROSCOPY WITH HOLMIUM LASER LITHOTRIPSY, STONE MANIPULATION AND EXTRACTION, LEFT STENT REPLACEMENT    LITHOTRIPSY         CURRENTMEDICATIONS       Previous Medications    ASPIRIN 81 MG EC TABLET    Take 1 tablet by mouth daily as needed    CHOLECALCIFEROL (VITAMIN D3 MAXIMUM STRENGTH PO)    Take by mouth    CLOPIDOGREL (PLAVIX) 75 MG TABLET    Take 1 tablet by mouth daily    COENZYME Q10 (COQ10 PO)    Take by mouth    MAGNESIUM LACTATE (MAG-TAB CR) 84 MG (7MEQ) TBCR EXTENDED RELEASE TABLET    Take 2.5 tablets by mouth daily    METOPROLOL TARTRATE (LOPRESSOR) 25 MG TABLET    1 tablet in the morning half a tablet in the afternoon    MULTI-DIGESTIVE ENZYMES PO    Take 1 capsule by mouth 3 times daily as needed    MULTIPLE VITAMINS-MINERALS (MULTIVITAMIN ADULT PO)    Take 1 tablet by mouth daily     PANTOPRAZOLE (PROTONIX) 40 MG TABLET    Take 1 tablet by mouth every morning (before breakfast)    POTASSIUM CITRATE (UROCIT-K) 10 MEQ (1080 MG) EXTENDED RELEASE TABLET    Take by mouth 3 times daily (with meals) 99mg    PROBIOTIC PRODUCT (PROBIOTIC DAILY PO)    Take by mouth    PROPAFENONE (RYTHMOL SR) 225 MG EXTENDED RELEASE CAPSULE

## 2023-12-19 RX ORDER — SODIUM CHLORIDE 9 MG/ML
INJECTION, SOLUTION INTRAVENOUS CONTINUOUS
Status: CANCELLED | OUTPATIENT
Start: 2023-12-19

## 2023-12-19 NOTE — PROGRESS NOTES
leave alone or drive yourself home. It is strongly suggested someone stay with you the first 24 hrs. Your surgery will be cancelled if you do not have a ride home. 8. A parent/legal guardian must accompany a child scheduled for surgery and plan to stay at the hospital until the child is discharged. Please do not bring other children with you. 9. Please wear simple, loose fitting clothing to the hospital.  Debbie Bard not bring valuables (money, credit cards, checkbooks, etc.) Do not wear any makeup (including no eye makeup) or nail polish on your fingers or toes. 10. DO NOT wear any jewelry or piercings on day of surgery. All body piercing jewelry must be removed. 11. If you have ___dentures, they will be removed before going to the OR; we will provide you a container. If you wear ___contact lenses or ___glasses, they will be removed; please bring a case for them. 12. Please see your family doctor/pediatrician for a history & physical and/or concerning medications. Bring any test results/reports from your physician's office. PCP__________________Phone___________H&P Appt. Date________             13 If you  have a Living Will and Durable Power of  for Healthcare, please bring in a copy. 15. Notify your Surgeon if you develop any illness between now and surgery  time, cough, cold, fever, sore throat, nausea, vomiting, etc.  Please notify your surgeon if you experience dizziness, shortness of breath or blurred vision between now & the time of your surgery             15. DO NOT shave your operative site 96 hours prior to surgery. For face & neck surgery, men may use an electric razor 48 hours prior to surgery. 16. Shower the night before or morning of surgery using an antibacterial soap or as you have been instructed. 17. To provide excellent care visitors will be limited to one in the room at any given time.              18.  Please bring picture ID and insurance card. 19.  Visit our web site for additional information:  [employer]. [email or web address]              20.During flu season no children under the age of 15 are permitted in the hospital for the safety of all patients. 21. If you take a long acting insulin in the evening only  take half of your usual  dose the night  before your procedure              22. If you use a c-pap please bring DOS if staying overnight,             23.For your convenience [facility] has a pharmacy on site to fill your prescriptions. 24. If you use oxygen and have a portable tank please bring it  with you the DOS             25. Bring a complete list of all your medications with name and dose include any supplements. 26. Other__________________________________________   *Please call pre admission testing if you any further questions   Darian Chacon         [address] [facility]  [phone]   [address] [place] [address]       VISITOR POLICY(subject to change)    Current policy is 2 visitors per patient. No children. Mask is  at the discretion of the facility. Visiting hours are 8a-8p. Overnight visitors will be at the discretion of the nurse. All policies subject to change. All above information reviewed with patient in person or by phone. Patient verbalizes understanding. All questions and concerns addressed.                                                                                                  Patient/Rep__patient__________________                                                                                                                                    PRE OP INSTRUCTIONS

## 2023-12-21 ENCOUNTER — APPOINTMENT (OUTPATIENT)
Dept: GENERAL RADIOLOGY | Age: 73
DRG: 661 | End: 2023-12-21
Attending: UROLOGY
Payer: MEDICARE

## 2023-12-21 ENCOUNTER — HOSPITAL ENCOUNTER (INPATIENT)
Age: 73
LOS: 1 days | Discharge: HOME | DRG: 661 | End: 2023-12-22
Attending: UROLOGY | Admitting: UROLOGY
Payer: MEDICARE

## 2023-12-21 DIAGNOSIS — N20.0 CALCULUS OF KIDNEY: ICD-10-CM

## 2023-12-21 PROBLEM — R42 DIZZINESS: Status: ACTIVE | Noted: 2023-12-21

## 2023-12-21 LAB
EKG ATRIAL RATE: 76 BPM
EKG DIAGNOSIS: NORMAL
EKG P AXIS: 26 DEGREES
EKG P-R INTERVAL: 282 MS
EKG Q-T INTERVAL: 436 MS
EKG QRS DURATION: 142 MS
EKG QTC CALCULATION (BAZETT): 490 MS
EKG R AXIS: -56 DEGREES
EKG T AXIS: -9 DEGREES
EKG VENTRICULAR RATE: 76 BPM

## 2023-12-21 PROCEDURE — 93010 ELECTROCARDIOGRAM REPORT: CPT | Performed by: INTERNAL MEDICINE

## 2023-12-21 PROCEDURE — 0TC68ZZ EXTIRPATION OF MATTER FROM RIGHT URETER, VIA NATURAL OR ARTIFICIAL OPENING ENDOSCOPIC: ICD-10-PCS | Performed by: UROLOGY

## 2023-12-21 PROCEDURE — 2580000003 HC RX 258: Performed by: ANESTHESIOLOGY

## 2023-12-21 PROCEDURE — 2580000003 HC RX 258: Performed by: UROLOGY

## 2023-12-21 PROCEDURE — 3700000000 HC ANESTHESIA ATTENDED CARE: Performed by: UROLOGY

## 2023-12-21 PROCEDURE — C2617 STENT, NON-COR, TEM W/O DEL: HCPCS | Performed by: UROLOGY

## 2023-12-21 PROCEDURE — 7100000000 HC PACU RECOVERY - FIRST 15 MIN: Performed by: UROLOGY

## 2023-12-21 PROCEDURE — 7100000001 HC PACU RECOVERY - ADDTL 15 MIN: Performed by: UROLOGY

## 2023-12-21 PROCEDURE — 6360000002 HC RX W HCPCS: Performed by: UROLOGY

## 2023-12-21 PROCEDURE — 6360000002 HC RX W HCPCS: Performed by: ANESTHESIOLOGY

## 2023-12-21 PROCEDURE — 6370000000 HC RX 637 (ALT 250 FOR IP): Performed by: ANESTHESIOLOGY

## 2023-12-21 PROCEDURE — C1894 INTRO/SHEATH, NON-LASER: HCPCS | Performed by: UROLOGY

## 2023-12-21 PROCEDURE — 6370000000 HC RX 637 (ALT 250 FOR IP): Performed by: UROLOGY

## 2023-12-21 PROCEDURE — 3600000014 HC SURGERY LEVEL 4 ADDTL 15MIN: Performed by: UROLOGY

## 2023-12-21 PROCEDURE — 0T768DZ DILATION OF RIGHT URETER WITH INTRALUMINAL DEVICE, VIA NATURAL OR ARTIFICIAL OPENING ENDOSCOPIC: ICD-10-PCS | Performed by: UROLOGY

## 2023-12-21 PROCEDURE — 3600000004 HC SURGERY LEVEL 4 BASE: Performed by: UROLOGY

## 2023-12-21 PROCEDURE — 2709999900 HC NON-CHARGEABLE SUPPLY: Performed by: UROLOGY

## 2023-12-21 PROCEDURE — 82365 CALCULUS SPECTROSCOPY: CPT

## 2023-12-21 PROCEDURE — 2720000010 HC SURG SUPPLY STERILE: Performed by: UROLOGY

## 2023-12-21 PROCEDURE — 1200000000 HC SEMI PRIVATE

## 2023-12-21 PROCEDURE — BT1D1ZZ FLUOROSCOPY OF RIGHT KIDNEY, URETER AND BLADDER USING LOW OSMOLAR CONTRAST: ICD-10-PCS | Performed by: UROLOGY

## 2023-12-21 PROCEDURE — C1769 GUIDE WIRE: HCPCS | Performed by: UROLOGY

## 2023-12-21 PROCEDURE — 3700000001 HC ADD 15 MINUTES (ANESTHESIA): Performed by: UROLOGY

## 2023-12-21 PROCEDURE — 74420 UROGRAPHY RTRGR +-KUB: CPT

## 2023-12-21 PROCEDURE — 93005 ELECTROCARDIOGRAM TRACING: CPT | Performed by: ANESTHESIOLOGY

## 2023-12-21 DEVICE — URETERAL STENT
Type: IMPLANTABLE DEVICE | Site: URETER | Status: FUNCTIONAL
Brand: CONTOUR™

## 2023-12-21 RX ORDER — LEVOFLOXACIN 5 MG/ML
500 INJECTION, SOLUTION INTRAVENOUS
Status: COMPLETED | OUTPATIENT
Start: 2023-12-21 | End: 2023-12-21

## 2023-12-21 RX ORDER — SODIUM CHLORIDE 0.9 % (FLUSH) 0.9 %
5-40 SYRINGE (ML) INJECTION PRN
Status: DISCONTINUED | OUTPATIENT
Start: 2023-12-21 | End: 2023-12-21 | Stop reason: HOSPADM

## 2023-12-21 RX ORDER — OXYBUTYNIN CHLORIDE 5 MG/1
5 TABLET ORAL 3 TIMES DAILY PRN
Qty: 21 TABLET | Refills: 0 | Status: SHIPPED | OUTPATIENT
Start: 2023-12-21 | End: 2024-01-08

## 2023-12-21 RX ORDER — METOPROLOL TARTRATE 25 MG/1
25 TABLET, FILM COATED ORAL 2 TIMES DAILY
Status: DISCONTINUED | OUTPATIENT
Start: 2023-12-22 | End: 2023-12-21

## 2023-12-21 RX ORDER — PROCHLORPERAZINE EDISYLATE 5 MG/ML
5 INJECTION INTRAMUSCULAR; INTRAVENOUS ONCE
Status: COMPLETED | OUTPATIENT
Start: 2023-12-21 | End: 2023-12-21

## 2023-12-21 RX ORDER — METOPROLOL TARTRATE 25 MG/1
12.5 TABLET, FILM COATED ORAL NIGHTLY
Status: DISCONTINUED | OUTPATIENT
Start: 2023-12-22 | End: 2023-12-22 | Stop reason: HOSPADM

## 2023-12-21 RX ORDER — HYDROCODONE BITARTRATE AND ACETAMINOPHEN 5; 325 MG/1; MG/1
1 TABLET ORAL EVERY 4 HOURS PRN
Status: DISCONTINUED | OUTPATIENT
Start: 2023-12-21 | End: 2023-12-22 | Stop reason: HOSPADM

## 2023-12-21 RX ORDER — TAMSULOSIN HYDROCHLORIDE 0.4 MG/1
0.4 CAPSULE ORAL DAILY
Qty: 30 CAPSULE | Refills: 0 | Status: SHIPPED | OUTPATIENT
Start: 2023-12-21 | End: 2024-09-09

## 2023-12-21 RX ORDER — SODIUM CHLORIDE, SODIUM LACTATE, POTASSIUM CHLORIDE, CALCIUM CHLORIDE 600; 310; 30; 20 MG/100ML; MG/100ML; MG/100ML; MG/100ML
INJECTION, SOLUTION INTRAVENOUS CONTINUOUS
Status: DISCONTINUED | OUTPATIENT
Start: 2023-12-21 | End: 2023-12-21 | Stop reason: HOSPADM

## 2023-12-21 RX ORDER — DIPHENHYDRAMINE HYDROCHLORIDE 50 MG/ML
12.5 INJECTION INTRAMUSCULAR; INTRAVENOUS
Status: DISCONTINUED | OUTPATIENT
Start: 2023-12-21 | End: 2023-12-21 | Stop reason: HOSPADM

## 2023-12-21 RX ORDER — 0.9 % SODIUM CHLORIDE 0.9 %
500 INTRAVENOUS SOLUTION INTRAVENOUS ONCE
Status: COMPLETED | OUTPATIENT
Start: 2023-12-21 | End: 2023-12-21

## 2023-12-21 RX ORDER — CLOPIDOGREL BISULFATE 75 MG/1
75 TABLET ORAL DAILY
Status: DISCONTINUED | OUTPATIENT
Start: 2023-12-22 | End: 2023-12-22 | Stop reason: HOSPADM

## 2023-12-21 RX ORDER — HYDRALAZINE HYDROCHLORIDE 20 MG/ML
10 INJECTION INTRAMUSCULAR; INTRAVENOUS
Status: DISCONTINUED | OUTPATIENT
Start: 2023-12-21 | End: 2023-12-21 | Stop reason: HOSPADM

## 2023-12-21 RX ORDER — MIDAZOLAM HYDROCHLORIDE 2 MG/2ML
2 INJECTION, SOLUTION INTRAMUSCULAR; INTRAVENOUS
Status: DISCONTINUED | OUTPATIENT
Start: 2023-12-21 | End: 2023-12-22 | Stop reason: HOSPADM

## 2023-12-21 RX ORDER — AMOXICILLIN 250 MG
1 CAPSULE ORAL 2 TIMES DAILY
Qty: 30 TABLET | Refills: 1 | Status: SHIPPED | OUTPATIENT
Start: 2023-12-21 | End: 2024-01-08 | Stop reason: ALTCHOICE

## 2023-12-21 RX ORDER — WATER 10 ML/10ML
INJECTION INTRAMUSCULAR; INTRAVENOUS; SUBCUTANEOUS
Status: COMPLETED | OUTPATIENT
Start: 2023-12-21 | End: 2023-12-21

## 2023-12-21 RX ORDER — SODIUM CHLORIDE 9 MG/ML
INJECTION, SOLUTION INTRAVENOUS PRN
Status: DISCONTINUED | OUTPATIENT
Start: 2023-12-21 | End: 2023-12-22 | Stop reason: HOSPADM

## 2023-12-21 RX ORDER — CIPROFLOXACIN 2 MG/ML
400 INJECTION, SOLUTION INTRAVENOUS
Status: DISCONTINUED | OUTPATIENT
Start: 2023-12-21 | End: 2023-12-21

## 2023-12-21 RX ORDER — LIDOCAINE HYDROCHLORIDE 10 MG/ML
0.5 INJECTION, SOLUTION EPIDURAL; INFILTRATION; INTRACAUDAL; PERINEURAL ONCE
Status: DISCONTINUED | OUTPATIENT
Start: 2023-12-21 | End: 2023-12-21 | Stop reason: HOSPADM

## 2023-12-21 RX ORDER — LABETALOL HYDROCHLORIDE 5 MG/ML
10 INJECTION, SOLUTION INTRAVENOUS
Status: DISCONTINUED | OUTPATIENT
Start: 2023-12-21 | End: 2023-12-21 | Stop reason: HOSPADM

## 2023-12-21 RX ORDER — OXYBUTYNIN CHLORIDE 5 MG/1
5 TABLET ORAL ONCE
Status: COMPLETED | OUTPATIENT
Start: 2023-12-21 | End: 2023-12-21

## 2023-12-21 RX ORDER — PROPAFENONE HYDROCHLORIDE 225 MG/1
225 CAPSULE, EXTENDED RELEASE ORAL EVERY 12 HOURS SCHEDULED
Status: DISCONTINUED | OUTPATIENT
Start: 2023-12-22 | End: 2023-12-22 | Stop reason: HOSPADM

## 2023-12-21 RX ORDER — OXYCODONE HYDROCHLORIDE 5 MG/1
5 TABLET ORAL
Status: COMPLETED | OUTPATIENT
Start: 2023-12-21 | End: 2023-12-21

## 2023-12-21 RX ORDER — ONDANSETRON 2 MG/ML
4 INJECTION INTRAMUSCULAR; INTRAVENOUS
Status: COMPLETED | OUTPATIENT
Start: 2023-12-21 | End: 2023-12-21

## 2023-12-21 RX ORDER — ASPIRIN 81 MG/1
81 TABLET ORAL DAILY
Status: DISCONTINUED | OUTPATIENT
Start: 2023-12-22 | End: 2023-12-22 | Stop reason: HOSPADM

## 2023-12-21 RX ORDER — SODIUM CHLORIDE 0.9 % (FLUSH) 0.9 %
5-40 SYRINGE (ML) INJECTION PRN
Status: DISCONTINUED | OUTPATIENT
Start: 2023-12-21 | End: 2023-12-22 | Stop reason: HOSPADM

## 2023-12-21 RX ORDER — PHENAZOPYRIDINE HYDROCHLORIDE 100 MG/1
200 TABLET, FILM COATED ORAL 3 TIMES DAILY PRN
Qty: 9 TABLET | Refills: 0 | Status: SHIPPED | OUTPATIENT
Start: 2023-12-21 | End: 2023-12-24

## 2023-12-21 RX ORDER — METOPROLOL TARTRATE 25 MG/1
25 TABLET, FILM COATED ORAL EVERY MORNING
Status: DISCONTINUED | OUTPATIENT
Start: 2023-12-22 | End: 2023-12-22 | Stop reason: HOSPADM

## 2023-12-21 RX ORDER — ACETAMINOPHEN 325 MG/1
650 TABLET ORAL ONCE
Status: COMPLETED | OUTPATIENT
Start: 2023-12-21 | End: 2023-12-21

## 2023-12-21 RX ORDER — SODIUM CHLORIDE 9 MG/ML
INJECTION, SOLUTION INTRAVENOUS CONTINUOUS
Status: DISCONTINUED | OUTPATIENT
Start: 2023-12-21 | End: 2023-12-22 | Stop reason: HOSPADM

## 2023-12-21 RX ORDER — SODIUM CHLORIDE 0.9 % (FLUSH) 0.9 %
5-40 SYRINGE (ML) INJECTION EVERY 12 HOURS SCHEDULED
Status: DISCONTINUED | OUTPATIENT
Start: 2023-12-21 | End: 2023-12-21 | Stop reason: HOSPADM

## 2023-12-21 RX ORDER — FENTANYL CITRATE 50 UG/ML
25 INJECTION, SOLUTION INTRAMUSCULAR; INTRAVENOUS EVERY 5 MIN PRN
Status: DISCONTINUED | OUTPATIENT
Start: 2023-12-21 | End: 2023-12-21 | Stop reason: HOSPADM

## 2023-12-21 RX ORDER — SODIUM CHLORIDE 9 MG/ML
INJECTION, SOLUTION INTRAVENOUS PRN
Status: DISCONTINUED | OUTPATIENT
Start: 2023-12-21 | End: 2023-12-21 | Stop reason: HOSPADM

## 2023-12-21 RX ORDER — MEPERIDINE HYDROCHLORIDE 25 MG/ML
12.5 INJECTION INTRAMUSCULAR; INTRAVENOUS; SUBCUTANEOUS
Status: DISCONTINUED | OUTPATIENT
Start: 2023-12-21 | End: 2023-12-21 | Stop reason: HOSPADM

## 2023-12-21 RX ORDER — HYDROCODONE BITARTRATE AND ACETAMINOPHEN 5; 325 MG/1; MG/1
1 TABLET ORAL EVERY 6 HOURS PRN
Qty: 20 TABLET | Refills: 0 | Status: SHIPPED | OUTPATIENT
Start: 2023-12-21 | End: 2023-12-26

## 2023-12-21 RX ORDER — HYDROMORPHONE HYDROCHLORIDE 2 MG/ML
0.5 INJECTION, SOLUTION INTRAMUSCULAR; INTRAVENOUS; SUBCUTANEOUS EVERY 5 MIN PRN
Status: DISCONTINUED | OUTPATIENT
Start: 2023-12-21 | End: 2023-12-21 | Stop reason: HOSPADM

## 2023-12-21 RX ORDER — SODIUM CHLORIDE 9 MG/ML
INJECTION, SOLUTION INTRAVENOUS CONTINUOUS
Status: DISCONTINUED | OUTPATIENT
Start: 2023-12-21 | End: 2023-12-21

## 2023-12-21 RX ORDER — SODIUM CHLORIDE 0.9 % (FLUSH) 0.9 %
5-40 SYRINGE (ML) INJECTION EVERY 12 HOURS SCHEDULED
Status: DISCONTINUED | OUTPATIENT
Start: 2023-12-21 | End: 2023-12-22 | Stop reason: HOSPADM

## 2023-12-21 RX ADMIN — Medication 10 ML: at 21:10

## 2023-12-21 RX ADMIN — HYDROCODONE BITARTRATE AND ACETAMINOPHEN 1 TABLET: 5; 325 TABLET ORAL at 23:19

## 2023-12-21 RX ADMIN — PROCHLORPERAZINE EDISYLATE 5 MG: 5 INJECTION INTRAMUSCULAR; INTRAVENOUS at 15:33

## 2023-12-21 RX ADMIN — HYDROMORPHONE HYDROCHLORIDE 0.5 MG: 2 INJECTION, SOLUTION INTRAMUSCULAR; INTRAVENOUS; SUBCUTANEOUS at 14:44

## 2023-12-21 RX ADMIN — ONDANSETRON 4 MG: 2 INJECTION INTRAMUSCULAR; INTRAVENOUS at 14:38

## 2023-12-21 RX ADMIN — OXYBUTYNIN CHLORIDE 5 MG: 5 TABLET ORAL at 15:29

## 2023-12-21 RX ADMIN — SODIUM CHLORIDE: 9 INJECTION, SOLUTION INTRAVENOUS at 21:07

## 2023-12-21 RX ADMIN — LEVOFLOXACIN 500 MG: 500 INJECTION, SOLUTION INTRAVENOUS at 11:53

## 2023-12-21 RX ADMIN — SODIUM CHLORIDE: 9 INJECTION, SOLUTION INTRAVENOUS at 11:44

## 2023-12-21 RX ADMIN — ACETAMINOPHEN 650 MG: 325 TABLET ORAL at 11:47

## 2023-12-21 RX ADMIN — SODIUM CHLORIDE 500 ML: 9 INJECTION, SOLUTION INTRAVENOUS at 19:14

## 2023-12-21 RX ADMIN — OXYCODONE HYDROCHLORIDE 5 MG: 5 TABLET ORAL at 16:31

## 2023-12-21 ASSESSMENT — PAIN - FUNCTIONAL ASSESSMENT
PAIN_FUNCTIONAL_ASSESSMENT: PREVENTS OR INTERFERES SOME ACTIVE ACTIVITIES AND ADLS
PAIN_FUNCTIONAL_ASSESSMENT: NONE - DENIES PAIN
PAIN_FUNCTIONAL_ASSESSMENT: PREVENTS OR INTERFERES SOME ACTIVE ACTIVITIES AND ADLS
PAIN_FUNCTIONAL_ASSESSMENT: PREVENTS OR INTERFERES SOME ACTIVE ACTIVITIES AND ADLS
PAIN_FUNCTIONAL_ASSESSMENT: ACTIVITIES ARE NOT PREVENTED

## 2023-12-21 ASSESSMENT — PAIN DESCRIPTION - LOCATION
LOCATION: ABDOMEN

## 2023-12-21 ASSESSMENT — PAIN DESCRIPTION - DESCRIPTORS
DESCRIPTORS: ACHING
DESCRIPTORS: NUMBNESS
DESCRIPTORS: ACHING

## 2023-12-21 ASSESSMENT — PAIN SCALES - GENERAL
PAINLEVEL_OUTOF10: 6
PAINLEVEL_OUTOF10: 9
PAINLEVEL_OUTOF10: 7
PAINLEVEL_OUTOF10: 6

## 2023-12-21 ASSESSMENT — PAIN DESCRIPTION - PAIN TYPE: TYPE: ACUTE PAIN

## 2023-12-21 ASSESSMENT — PAIN DESCRIPTION - ORIENTATION
ORIENTATION: RIGHT
ORIENTATION: LEFT

## 2023-12-21 ASSESSMENT — PAIN DESCRIPTION - ONSET: ONSET: ON-GOING

## 2023-12-21 ASSESSMENT — PAIN DESCRIPTION - FREQUENCY: FREQUENCY: INTERMITTENT

## 2023-12-21 NOTE — PROGRESS NOTES
Pt arrived to PACU from OR, VSS, pt arouses to voice. No urethral drainage noted; string in place. Will continue to monitor.

## 2023-12-21 NOTE — OP NOTE
Urology Operative Note  [facility]     Patient: Rangel Bowers MRN: [id number]  Room/Bed: OR/NONE   YOB: 1950  Age/Sex: 68 y.o.male  Admission Date: 12/21/2023     Date of Operation: 12/21/2023    Preoperative Diagnosis: RIGHT side renal stones (1.5 cm and 8 mm, approximately 2.3 cm total stone burden)    Postoperative Diagnosis: same    Procedure:    1. CYSTOSCOPY, RIGHT URETEROSCOPY, LASER LITHOTRIPSY, STEERABLE VACUUM STONE EXTRACTION, RETROGRADE PYELOGRAM, STENT PLACMENT, FLUOROSCOPIC GUIDANCE  2. Fluoroscopy <1hr MD time    Surgeon:   Hannah Jackson MD, JENISE    Anesthesia: General LMA anesthesia    Indications: Rangel Bowers is a 68 y.o. male who presents for the above named surgery. Informed consent was obtained and the risks, benefits, and details of the procedure were explained to the patient who elected to proceed. Details of Procedure: The patient was brought to the operating room and placed in the supine position on the operating room table. SCDs were placed on the lower extremities. Following induction of anesthesia the patient was positioned in a lithotomy position, all pressure points were padded, and the genitals were prepped and draped in the usual sterile fashion. A routine timeout was performed, confirming the patient, procedure, site, risk of fire, patient allergies and confirming that preoperative antibiotics had been administered prior to beginning. A 21 fr rigid cystoscope was advance via a normal appearing urethra into the bladder. The bladder was inspected and there were no suspicious lesions, stones or diverticula seen. Attention was turned to the right ureteral orifice. A 0.035 sensor wire was advanced and positioned within the collecting system under control of fluoroscopy. Over the wire a 12/14 by 46 cm ureteral access sheath was positioned within the ureter. A flexible ureteroscopy was advanced until the stones were identified as detailed below. string was left attached to the distal stent and brought out through the urethral meatus. The bladder was emptied and the scope removed. At the end of the procedure all counts were correct. The patient tolerated the procedure well and was transported to the PACU in stable    At the end of the procedure all counts were correct. The patient tolerated the procedure well and was transported to the PACU in stable condition. Findings: Large right renal stones 1.5 cm upper pole, 8 mm lower pole, also small stone in the interpolar calyx, required significant time for fragmentation but ultimately entire stone burden removed with laser lithotripsy and CVAC device    Estimated Blood Loss: minimal                  Drains: 6fr x 26 cm right right ureteral stent (string)          Specimens: stone for analysis    Complications: none apparent           Disposition:  PACU - hemodynamically stable.      Plan: stent out in 5 days, followup 6 weeks with renal ultrasound

## 2023-12-21 NOTE — H&P
Urology Preoperative History & Physical  [facility]     Patient: Rangel Bowers MRN: [id number]  Room/Bed: OR/NONE   YOB: 1950  Age/Sex: 68 y.o.male  Admission Date: 12/21/2023     Date of Service:  12/21/2023    ASSESSMENT/PLAN     Right renal stone here for ureteroscopy with CVAC    Plan: To OR for above procedure    All patient questions were answered. He understands the plan as listed above. HISTORY     Chief Complaint: As above    History of Present Illness: Rangel Bowers is a 68 y.o. male with above listed problems. No changes in history/physical since last evaluation. No change in symptoms. Past Medical History:  He has a past medical history of Atrial fibrillation ([address]), Chronic kidney disease, Diverticulitis, GERD (gastroesophageal reflux disease), Kidney stones, Snores, and Splenic artery aneurysm ([address]). Hospital Problem List:  Active Problems:    * No active hospital problems. *  Resolved Problems:    * No resolved hospital problems. *      Past Surgical History:  He has a past surgical history that includes back surgery; Lithotripsy; Cholecystectomy, laparoscopic (12-27-10); Colonoscopy (10/28/14); Cystocopy (06/29/2018); and Cystoscopy (07/31/2018). Social History:  He reports that he has never smoked. He has never used smokeless tobacco. He reports that he does not drink alcohol and does not use drugs. Family History:  family history includes Cancer in his brother, brother, and mother; Heart Disease in his brother, father, and sister. Allergies:   Allergies   Allergen Reactions    Phenergan [Promethazine]      Sees words and numbers       Medications:  Scheduled Meds:   lidocaine PF  0.5 mL IntraDERmal Once    levofloxacin  500 mg IntraVENous On Call to OR    acetaminophen  650 mg Oral Once     Continuous Infusions:   lactated ringers IV soln      sodium chloride       PRN Meds:    Review of Systems:  Pertinent positives/negatives reviewed in

## 2023-12-21 NOTE — DISCHARGE INSTRUCTIONS
uncontrolled narrow-angle glaucoma;  a blockage in your digestive tract (stomach or intestines); or  if you are unable to urinate. Tell your doctor if you have ever had:  glaucoma;  trouble urinating;  ulcerative colitis;  Parkinson's disease;  dementia;  a nerve disorder that affects your heart rate, blood pressure, or digestion;  myasthenia gravis; or  a stomach disorder such as gastroesophageal reflux disease (GERD) or slow digestion. Tell your doctor if you are pregnant or breastfeeding. How should I take oxybutynin? Follow all directions on your prescription label and read all medication guides or instruction sheets. Use the medicine exactly as directed. Take oxybutynin with a full glass of water, at the same time each day. You may take oxybutynin with or without food. Swallow the extended-release tablet whole and do not crush, chew, or break it. Measure liquid medicine with the supplied measuring device (not a kitchen spoon). Part of a tablet shell may appear in your stool but this will not make the medicine less effective. Store at room temperature away from moisture and heat. What happens if I miss a dose? Take the medicine as soon as you can, but skip the missed dose if it is almost time for your next dose. Do not take two doses at one time. What happens if I overdose? Seek emergency medical attention or call the Poison Help line at 1-868.135.9207. What should I avoid while using oxybutynin? Avoid driving or hazardous activity until you know how this medicine will affect you. Your reactions could be impaired. Avoid becoming overheated or dehydrated during exercise and in hot weather. Oxybutynin can decrease sweating and you may be more prone to heat stroke. Drinking alcohol with this medicine can increase side effects. What are the possible side effects of oxybutynin?   Get emergency medical help if you have signs of an allergic reaction: hives; difficult breathing; swelling of your face, advice about side effects. You may report side effects to FDA at 6-037-FDA-1699. What other drugs will affect tamsulosin? Tell your doctor about all your current medicines. Many drugs can increase your risk of very low blood pressure while taking tamsulosin, especially:  medicines similar to tamsulosin (alfuzosin, doxazosin, prazosin, silodosin, or terazosin);  heart or blood pressure medication; or  sildenafil (Viagra) and other erectile dysfunction medicines. This list is not complete and many other drugs may affect tamsulosin. This includes prescription and over-the-counter medicines, vitamins, and herbal products. Not all possible drug interactions are listed here. Where can I get more information? Your pharmacist can provide more information about tamsulosin. Remember, keep this and all other medicines out of the reach of children, never share your medicines with others, and use this medication only for the indication prescribed. Every effort has been made to ensure that the information provided by [address] [place] [address] [facility] is accurate, up-to-date, and complete, but no guarantee is made to that effect. Drug information contained herein may be time sensitive. [employer] information has been compiled for use by healthcare practitioners and consumers in the [facility] and therefore [employer] does not warrant that uses outside of the [facility] are appropriate, unless specifically indicated otherwise. [facility] drug information does not endorse drugs, diagnose patients or recommend therapy. [facility] drug information is an informational resource designed to assist licensed healthcare practitioners in caring for their patients and/or to serve consumers viewing this service as a supplement to, and not a substitute for, the expertise, skill, knowledge and judgment of healthcare practitioners.  The absence of a warning for a given drug or drug combination in no way should be construed to indicate that the drug

## 2023-12-22 VITALS
HEART RATE: 85 BPM | BODY MASS INDEX: 27.37 KG/M2 | TEMPERATURE: 98.3 F | HEIGHT: 68 IN | WEIGHT: 180.6 LBS | OXYGEN SATURATION: 92 % | SYSTOLIC BLOOD PRESSURE: 103 MMHG | DIASTOLIC BLOOD PRESSURE: 70 MMHG | RESPIRATION RATE: 18 BRPM

## 2023-12-22 PROCEDURE — 6370000000 HC RX 637 (ALT 250 FOR IP): Performed by: UROLOGY

## 2023-12-22 PROCEDURE — 2580000003 HC RX 258: Performed by: ANESTHESIOLOGY

## 2023-12-22 RX ADMIN — PROPAFENONE HYDROCHLORIDE 225 MG: 225 CAPSULE, EXTENDED RELEASE ORAL at 10:26

## 2023-12-22 RX ADMIN — METOPROLOL TARTRATE 25 MG: 25 TABLET, FILM COATED ORAL at 12:09

## 2023-12-22 RX ADMIN — HYDROCODONE BITARTRATE AND ACETAMINOPHEN 1 TABLET: 5; 325 TABLET ORAL at 11:57

## 2023-12-22 RX ADMIN — Medication 10 ML: at 10:26

## 2023-12-22 ASSESSMENT — PAIN SCALES - GENERAL
PAINLEVEL_OUTOF10: 5
PAINLEVEL_OUTOF10: 5

## 2023-12-22 ASSESSMENT — PAIN DESCRIPTION - LOCATION
LOCATION: ABDOMEN
LOCATION: ABDOMEN

## 2023-12-22 ASSESSMENT — PAIN DESCRIPTION - ORIENTATION
ORIENTATION: RIGHT;LEFT
ORIENTATION: RIGHT;LEFT

## 2023-12-22 ASSESSMENT — PAIN DESCRIPTION - DESCRIPTORS
DESCRIPTORS: SHARP
DESCRIPTORS: SHARP

## 2023-12-22 ASSESSMENT — PAIN DESCRIPTION - PAIN TYPE: TYPE: ACUTE PAIN

## 2023-12-22 NOTE — CARE COORDINATION
Case Management Assessment  Initial Evaluation    Date/Time of Evaluation: 12/22/2023 2:48 PM  Assessment Completed by: Macho Grewal RN    If patient is discharged prior to next notation, then this note serves as note for discharge by case management. Patient Name: Therese Mccabe                   YOB: 1950  Diagnosis: Calculus of kidney [N20.0]  Dizziness [R42]                   Date / Time: 12/21/2023 10:59 AM    Patient Admission Status: Inpatient   Readmission Risk (Low < 19, Mod (19-27), High > 27): Readmission Risk Score: 8.7    Current PCP: Mario Johnson MD  PCP verified by CM? Yes    Chart Reviewed: Yes      History Provided by: Patient  Patient Orientation: Alert and Oriented    Patient Cognition: Alert    Hospitalization in the last 30 days (Readmission):  No    If yes, Readmission Assessment in  Navigator will be completed. Advance Directives:      Code Status: Prior   Patient's Primary Decision Maker is: Legal Next of Kin    Primary Decision Maker: Onel S Edel [facility] - [phone]    Secondary Decision Maker: Grayson Irving [facility] - [phone]    Discharge Planning:    Patient lives with: Spouse/Significant Other Type of Home: House  Primary Care Giver: Self  Patient Support Systems include: Spouse/Significant Other   Current Financial resources: Medicare  Current community resources: None  Current services prior to admission: None            Current DME:              Type of Home Care services:  None    ADLS  Prior functional level: Independent in ADLs/IADLs  Current functional level: Independent in ADLs/IADLs    PT AM-PAC:   /24  OT AM-PAC:   /24    Family can provide assistance at DC: Yes  Would you like Case Management to discuss the discharge plan with any other family members/significant others, and if so, who?  No  Plans to Return to Present Housing: Yes  Other Identified Issues/Barriers to RETURNING to current housing: none  Potential Assistance needed at

## 2023-12-22 NOTE — PROGRESS NOTES
Urology Progress Note  [facility]    Provider: Yamileth Barnett PA-C  Patient ID:  Admission Date: 2023 Name: Zak Sousa  OR Date: 2023 MRN: [id number]   Patient Location: [id number] : 1950  Attending: Mariah Lanza MD Date of Service: 2023  PCP: Roselia Miramontes MD     Diagnoses:  1. Calculus of kidney      POD 1 R URS, LL w/ cvac with stent placement (string)  Doing okay  Afebrile    Assessment/Plan:  Stent removal x4 more days  OOB as tolerated  Normal diet  Will discharge home today call urology with any questions    The patient had a chance to ask questions which were answered. he understands the above plan. Subjective:   Zak Sousa is a 68 y.o. male. He was seen and examined this morning. Today we discussed stent removal and ANDREY in 6 weeks. Objective:   Vitals:  Vitals:    23 2319   BP:    Pulse:    Resp: 18   Temp:    SpO2:        Intake/Output Summary (Last 24 hours) at 2023 0845  Last data filed at 2023 1800  Gross per 24 hour   Intake 1780 ml   Output 250 ml   Net 1530 ml     Physical Exam:  Gen: Alert and oriented x3, no acute distress  CV: Regular rate   Resp: unlabored respirations  Abd: Soft, non-distended, non-tender, no masses  Ext: no peripheral edema noted, moves upper and lower extremities spontaneously  Skin: warmand well perfused, no rashes noted on the face, or arms.      Labs:  Lab Results   Component Value Date    WBC 10.3 2023    HGB 15.7 2023    HCT 46.7 2023    MCV 88.7 2023     2023     Lab Results   Component Value Date    CREATININE 0.8 2023    BUN 12 2023     2023    K 4.3 2023     2023    CO2 27 2023       Yamileth Barnett PA-C   2023

## 2023-12-22 NOTE — FLOWSHEET NOTE
Pt resting comfortably in bed and eating dinner. Pt's BP responding to fluid bolus. Family at bedside. Report given to nightshift RN. Will transfer care.

## 2023-12-22 NOTE — FLOWSHEET NOTE
SUBJECTIVE:   Jerod Tamayo is a 29 year old male who presents to clinic today for the following health issues:      Musculoskeletal problem/Wrist pain      Duration: x 3 months     Description  Location: right wrist- believes he injured it while playing tennis ,may have twisted it.  no swelling. But now  concerned with ongoing pain.  hurts mostly when he uses it     Intensity:  mild    Accompanying signs and symptoms: pain  goes to side of hand. No numbness, no swelling.  no redness etc         History  Previous similar problem: no   Previous evaluation:  none    Precipitating or alleviating factors:  Trauma or overuse: YES-   Aggravating factors include: exercise and overuse    Therapies tried and outcome: nothing        Problem list and histories reviewed & adjusted, as indicated.  Additional history: as documented    There is no problem list on file for this patient.    Past Surgical History:   Procedure Laterality Date     NO HISTORY OF SURGERY         Social History     Tobacco Use     Smoking status: Never Smoker     Smokeless tobacco: Never Used   Substance Use Topics     Alcohol use: Yes     Frequency: Monthly or less     Family History   Problem Relation Age of Onset     Thyroid Disease Mother      Diabetes No family hx of      Coronary Artery Disease No family hx of            Reviewed and updated as needed this visit by clinical staff       Reviewed and updated as needed this visit by Provider         ROS:  Constitutional, HEENT, cardiovascular, pulmonary, gi and gu systems are negative, except as otherwise noted.    OBJECTIVE:     /78   Pulse 84   Temp 97.7  F (36.5  C) (Tympanic)   Ht 1.829 m (6')   Wt 82.1 kg (181 lb)   SpO2 99%   BMI 24.55 kg/m    Body mass index is 24.55 kg/m .  GENERAL: healthy, alert and no distress  RESP: lungs clear to auscultation - no rales, rhonchi or wheezes  CV: regular rate and rhythm, normal S1 S2, no S3 or S4, no murmur, click or rub, no peripheral edema  Phase 1 complete, pt seen by anesthesiologist. VSS, pt resting comfortably. No urethral drainage noted. Will transfer to 3A, family updated. and peripheral pulses strong  MS: rt wrist with  normal range of motion, no edema and no tenderness to palpation but has aggravation of pain with wrist rotation only especially along the ulner side of wrist   NEURO: Normal strength and tone, mentation intact and speech normal        ASSESSMENT/PLAN:       (M25.531) Right wrist pain  (primary encounter diagnosis)  Comment: likely strain   Plan: naproxen (NAPROSYN) 500 MG tablet            discussed cares and symptomatic treatment including  adequate pain control,rest,   stretches etc.    he will do follow up if no improvement or problem. Consider further evaluation and  physical therapy if needed.       Patient expressed understanding and agreement with treatment plan. All patient's questions were answered, will let me know if has more later.  Medications: Rx's: Reviewed the potential side effects/complications of medications prescribed.     Sarah Watts MD  [facility]

## 2023-12-22 NOTE — PROGRESS NOTES
Data- discharge order received, pt verbalized agreement to discharge, disposition to previous residence, no needs for HHC/DME. Action- discharge instructions prepared and given to pt, pt verbalized understanding. Medication information packet given r/t NEW and/or CHANGED prescriptions emphasizing name/purpose/side effects, pt verbalized understanding. Discharge instruction summary: Diet- as tolerated, Activity- as tolerated, Primary Care Physician as followsAmada Brewer -[phone] f/u appointment needs to be scheduled, follow up with Urology, immunizations reviewed, prescription medications filled with [facility] pharmacy. Inpatient surgical procedure precautions reviewed: handout given and written instructions. CHF Education reviewed. Pt/ Family has had a total of 60 minutes CHF education this admission encounter. 1. WEIGHT: Admit Weight - Scale: 77.6 kg (171 lb) (12/19/23 1134)        Today  Weight - Scale: 81.9 kg (180 lb 9.6 oz) (12/22/23 0446)       2. O2 SAT.: SpO2: 92 % (12/22/23 1433)    Response- Pt belongings gathered, IV removed. Disposition is home (no HHC/DME needs), transported with belongings, taken to lobby via w/c w/ staff, no complications.

## 2023-12-22 NOTE — FLOWSHEET NOTE
Pt hypotensive upon arrival to 3A s/p ambulation of 15 ft.; BP 86/63(71), HR 97. Will contact anesthesia for a 500mL NS bolus.

## 2023-12-22 NOTE — CARE COORDINATION
12/22/23 1322   IMM Letter   IMM Letter given to Patient/Family/Significant other/Guardian/POA/by: Jayden Fraire given to Garth Gianfranco by Yudelka Monroy RN   IMM Letter date given: 12/22/23   IMM Letter time given: 1323     Electronically signed by Refugio Malone RN on 12/22/2023 at 1:23 PM

## 2023-12-26 LAB
APPEARANCE STONE: NORMAL
COMPN STONE: NORMAL
SPECIMEN WT: 401 MG

## 2024-01-03 ENCOUNTER — TELEPHONE (OUTPATIENT)
Dept: INTERNAL MEDICINE CLINIC | Age: 74
End: 2024-01-03

## 2024-01-03 NOTE — TELEPHONE ENCOUNTER
Pt wife called pt released from Hospital 12/29 --pt [facility] for HFU 1/8---does TCM need done?  Thanks.   No

## 2024-01-08 ENCOUNTER — OFFICE VISIT (OUTPATIENT)
Dept: INTERNAL MEDICINE CLINIC | Age: 74
End: 2024-01-08
Payer: MEDICARE

## 2024-01-08 VITALS
HEART RATE: 66 BPM | DIASTOLIC BLOOD PRESSURE: 76 MMHG | WEIGHT: 177 LBS | BODY MASS INDEX: 26.91 KG/M2 | SYSTOLIC BLOOD PRESSURE: 124 MMHG | OXYGEN SATURATION: 97 %

## 2024-01-08 DIAGNOSIS — R35.0 FREQUENCY OF MICTURITION: ICD-10-CM

## 2024-01-08 DIAGNOSIS — S37.011A RENAL HEMATOMA, RIGHT, INITIAL ENCOUNTER: ICD-10-CM

## 2024-01-08 DIAGNOSIS — I48.0 PAF (PAROXYSMAL ATRIAL FIBRILLATION) (HCC): ICD-10-CM

## 2024-01-08 DIAGNOSIS — I10 PRIMARY HYPERTENSION: ICD-10-CM

## 2024-01-08 DIAGNOSIS — Z09 HOSPITAL DISCHARGE FOLLOW-UP: ICD-10-CM

## 2024-01-08 DIAGNOSIS — Z09 HOSPITAL DISCHARGE FOLLOW-UP: Primary | ICD-10-CM

## 2024-01-08 DIAGNOSIS — I72.8 SPLENIC ARTERY ANEURYSM (HCC): ICD-10-CM

## 2024-01-08 PROCEDURE — 1111F DSCHRG MED/CURRENT MED MERGE: CPT | Performed by: INTERNAL MEDICINE

## 2024-01-08 PROCEDURE — 3078F DIAST BP <80 MM HG: CPT | Performed by: INTERNAL MEDICINE

## 2024-01-08 PROCEDURE — 1036F TOBACCO NON-USER: CPT | Performed by: INTERNAL MEDICINE

## 2024-01-08 PROCEDURE — 81002 URINALYSIS NONAUTO W/O SCOPE: CPT | Performed by: INTERNAL MEDICINE

## 2024-01-08 PROCEDURE — 1123F ACP DISCUSS/DSCN MKR DOCD: CPT | Performed by: INTERNAL MEDICINE

## 2024-01-08 PROCEDURE — G8427 DOCREV CUR MEDS BY ELIG CLIN: HCPCS | Performed by: INTERNAL MEDICINE

## 2024-01-08 PROCEDURE — 3017F COLORECTAL CA SCREEN DOC REV: CPT | Performed by: INTERNAL MEDICINE

## 2024-01-08 PROCEDURE — 3074F SYST BP LT 130 MM HG: CPT | Performed by: INTERNAL MEDICINE

## 2024-01-08 PROCEDURE — G8484 FLU IMMUNIZE NO ADMIN: HCPCS | Performed by: INTERNAL MEDICINE

## 2024-01-08 PROCEDURE — G8417 CALC BMI ABV UP PARAM F/U: HCPCS | Performed by: INTERNAL MEDICINE

## 2024-01-08 PROCEDURE — 99214 OFFICE O/P EST MOD 30 MIN: CPT | Performed by: INTERNAL MEDICINE

## 2024-01-08 ASSESSMENT — ENCOUNTER SYMPTOMS
CHANGE IN BOWEL HABIT: 0
ABDOMINAL PAIN: 1
SWOLLEN GLANDS: 0
SORE THROAT: 0

## 2024-01-09 LAB
BILIRUBIN, POC: NORMAL
BLOOD URINE, POC: NORMAL
CLARITY, POC: NORMAL
COLOR, POC: NORMAL
GLUCOSE URINE, POC: NORMAL
KETONES, POC: NORMAL
LEUKOCYTE EST, POC: NORMAL
NITRITE, POC: NORMAL
PH, POC: 6.5
PROTEIN, POC: NORMAL
SPECIFIC GRAVITY, POC: 1.02
UROBILINOGEN, POC: 0.2

## 2024-01-10 LAB
ANION GAP SERPL CALCULATED.3IONS-SCNC: 13 MMOL/L (ref 3–16)
BASOPHILS # BLD: 0 K/UL (ref 0–0.2)
BASOPHILS NFR BLD: 0.3 %
BUN SERPL-MCNC: 11 MG/DL (ref 7–20)
CALCIUM SERPL-MCNC: 8.4 MG/DL (ref 8.3–10.6)
CHLORIDE SERPL-SCNC: 104 MMOL/L (ref 99–110)
CO2 SERPL-SCNC: 22 MMOL/L (ref 21–32)
CREAT SERPL-MCNC: 0.9 MG/DL (ref 0.8–1.3)
DEPRECATED RDW RBC AUTO: 15.4 % (ref 12.4–15.4)
EOSINOPHIL # BLD: 0.2 K/UL (ref 0–0.6)
EOSINOPHIL NFR BLD: 2.6 %
GFR SERPLBLD CREATININE-BSD FMLA CKD-EPI: >60 ML/MIN/{1.73_M2}
GLUCOSE SERPL-MCNC: 116 MG/DL (ref 70–99)
HCT VFR BLD AUTO: 34.2 % (ref 40.5–52.5)
HGB BLD-MCNC: 11.3 G/DL (ref 13.5–17.5)
LYMPHOCYTES # BLD: 1.5 K/UL (ref 1–5.1)
LYMPHOCYTES NFR BLD: 22.7 %
MCH RBC QN AUTO: 29 PG (ref 26–34)
MCHC RBC AUTO-ENTMCNC: 33.2 G/DL (ref 31–36)
MCV RBC AUTO: 87.4 FL (ref 80–100)
MONOCYTES # BLD: 0.8 K/UL (ref 0–1.3)
MONOCYTES NFR BLD: 11.8 %
NEUTROPHILS # BLD: 4.2 K/UL (ref 1.7–7.7)
NEUTROPHILS NFR BLD: 62.6 %
PLATELET # BLD AUTO: 390 K/UL (ref 135–450)
PMV BLD AUTO: 8.8 FL (ref 5–10.5)
POTASSIUM SERPL-SCNC: 4.2 MMOL/L (ref 3.5–5.1)
RBC # BLD AUTO: 3.91 M/UL (ref 4.2–5.9)
SODIUM SERPL-SCNC: 139 MMOL/L (ref 136–145)
WBC # BLD AUTO: 6.8 K/UL (ref 4–11)

## 2024-01-11 ENCOUNTER — TELEPHONE (OUTPATIENT)
Dept: INTERNAL MEDICINE CLINIC | Age: 74
End: 2024-01-11

## 2024-01-11 NOTE — TELEPHONE ENCOUNTER
Please advise patient that the CAT scan did not show any change in the size or the appearance of the hematoma he had and it looks stable

## 2024-03-20 SDOH — HEALTH STABILITY: PHYSICAL HEALTH: ON AVERAGE, HOW MANY MINUTES DO YOU ENGAGE IN EXERCISE AT THIS LEVEL?: 30 MIN

## 2024-03-20 SDOH — HEALTH STABILITY: PHYSICAL HEALTH: ON AVERAGE, HOW MANY DAYS PER WEEK DO YOU ENGAGE IN MODERATE TO STRENUOUS EXERCISE (LIKE A BRISK WALK)?: 5 DAYS

## 2024-03-20 ASSESSMENT — PATIENT HEALTH QUESTIONNAIRE - PHQ9
SUM OF ALL RESPONSES TO PHQ QUESTIONS 1-9: 0
SUM OF ALL RESPONSES TO PHQ QUESTIONS 1-9: 0
2. FEELING DOWN, DEPRESSED OR HOPELESS: NOT AT ALL
SUM OF ALL RESPONSES TO PHQ QUESTIONS 1-9: 0
SUM OF ALL RESPONSES TO PHQ9 QUESTIONS 1 & 2: 0
1. LITTLE INTEREST OR PLEASURE IN DOING THINGS: NOT AT ALL
SUM OF ALL RESPONSES TO PHQ QUESTIONS 1-9: 0

## 2024-03-20 ASSESSMENT — LIFESTYLE VARIABLES
HOW MANY STANDARD DRINKS CONTAINING ALCOHOL DO YOU HAVE ON A TYPICAL DAY: PATIENT DOES NOT DRINK
HOW MANY STANDARD DRINKS CONTAINING ALCOHOL DO YOU HAVE ON A TYPICAL DAY: 0
HOW OFTEN DO YOU HAVE A DRINK CONTAINING ALCOHOL: 1
HOW OFTEN DO YOU HAVE A DRINK CONTAINING ALCOHOL: NEVER
HOW OFTEN DO YOU HAVE SIX OR MORE DRINKS ON ONE OCCASION: 1

## 2024-03-21 ENCOUNTER — OFFICE VISIT (OUTPATIENT)
Dept: INTERNAL MEDICINE CLINIC | Age: 74
End: 2024-03-21

## 2024-03-21 VITALS
WEIGHT: 166.6 LBS | HEIGHT: 68 IN | DIASTOLIC BLOOD PRESSURE: 76 MMHG | BODY MASS INDEX: 25.25 KG/M2 | HEART RATE: 55 BPM | OXYGEN SATURATION: 98 % | SYSTOLIC BLOOD PRESSURE: 136 MMHG

## 2024-03-21 DIAGNOSIS — I10 PRIMARY HYPERTENSION: ICD-10-CM

## 2024-03-21 DIAGNOSIS — N40.1 BENIGN PROSTATIC HYPERPLASIA WITH URINARY FREQUENCY: ICD-10-CM

## 2024-03-21 DIAGNOSIS — Z00.00 MEDICARE ANNUAL WELLNESS VISIT, SUBSEQUENT: ICD-10-CM

## 2024-03-21 DIAGNOSIS — Z00.00 MEDICARE ANNUAL WELLNESS VISIT, SUBSEQUENT: Primary | ICD-10-CM

## 2024-03-21 DIAGNOSIS — I48.0 PAROXYSMAL ATRIAL FIBRILLATION (HCC): Chronic | ICD-10-CM

## 2024-03-21 DIAGNOSIS — I48.0 PAF (PAROXYSMAL ATRIAL FIBRILLATION) (HCC): ICD-10-CM

## 2024-03-21 DIAGNOSIS — E78.49 OTHER HYPERLIPIDEMIA: ICD-10-CM

## 2024-03-21 DIAGNOSIS — R35.0 BENIGN PROSTATIC HYPERPLASIA WITH URINARY FREQUENCY: ICD-10-CM

## 2024-03-21 DIAGNOSIS — Z12.11 COLON CANCER SCREENING: ICD-10-CM

## 2024-03-21 PROBLEM — R42 DIZZINESS: Status: RESOLVED | Noted: 2023-12-21 | Resolved: 2024-03-21

## 2024-03-21 LAB
ANION GAP SERPL CALCULATED.3IONS-SCNC: 8 MMOL/L (ref 3–16)
BASOPHILS # BLD: 0 K/UL (ref 0–0.2)
BASOPHILS NFR BLD: 0.5 %
BUN SERPL-MCNC: 12 MG/DL (ref 7–20)
CALCIUM SERPL-MCNC: 9.4 MG/DL (ref 8.3–10.6)
CHLORIDE SERPL-SCNC: 106 MMOL/L (ref 99–110)
CHOLEST SERPL-MCNC: 77 MG/DL (ref 0–199)
CO2 SERPL-SCNC: 28 MMOL/L (ref 21–32)
CREAT SERPL-MCNC: 1 MG/DL (ref 0.8–1.3)
DEPRECATED RDW RBC AUTO: 14.5 % (ref 12.4–15.4)
EOSINOPHIL # BLD: 0.2 K/UL (ref 0–0.6)
EOSINOPHIL NFR BLD: 3 %
GFR SERPLBLD CREATININE-BSD FMLA CKD-EPI: >60 ML/MIN/{1.73_M2}
GLUCOSE SERPL-MCNC: 101 MG/DL (ref 70–99)
HCT VFR BLD AUTO: 41.3 % (ref 40.5–52.5)
HDLC SERPL-MCNC: 28 MG/DL (ref 40–60)
HGB BLD-MCNC: 14.2 G/DL (ref 13.5–17.5)
LDLC SERPL CALC-MCNC: 30 MG/DL
LYMPHOCYTES # BLD: 2.4 K/UL (ref 1–5.1)
LYMPHOCYTES NFR BLD: 31.7 %
MCH RBC QN AUTO: 29.3 PG (ref 26–34)
MCHC RBC AUTO-ENTMCNC: 34.3 G/DL (ref 31–36)
MCV RBC AUTO: 85.5 FL (ref 80–100)
MONOCYTES # BLD: 0.7 K/UL (ref 0–1.3)
MONOCYTES NFR BLD: 9.4 %
NEUTROPHILS # BLD: 4.3 K/UL (ref 1.7–7.7)
NEUTROPHILS NFR BLD: 55.4 %
PLATELET # BLD AUTO: 233 K/UL (ref 135–450)
PMV BLD AUTO: 9.4 FL (ref 5–10.5)
POTASSIUM SERPL-SCNC: 4.6 MMOL/L (ref 3.5–5.1)
RBC # BLD AUTO: 4.84 M/UL (ref 4.2–5.9)
SODIUM SERPL-SCNC: 142 MMOL/L (ref 136–145)
TRIGL SERPL-MCNC: 97 MG/DL (ref 0–150)
VLDLC SERPL CALC-MCNC: 19 MG/DL
WBC # BLD AUTO: 7.7 K/UL (ref 4–11)

## 2024-03-21 ASSESSMENT — ENCOUNTER SYMPTOMS
SINUS PAIN: 0
ABDOMINAL PAIN: 0
COUGH: 0
COLOR CHANGE: 0
CHEST TIGHTNESS: 0
BLOOD IN STOOL: 0
CONSTIPATION: 0
WHEEZING: 0
SHORTNESS OF BREATH: 0

## 2024-03-21 NOTE — PROGRESS NOTES
General Appearance: alert and oriented to person, place and time, well developed and well- nourished, in no acute distress  Skin: warm and dry, no rash or erythema  Head: normocephalic and atraumatic  Eyes: pupils equal, round, and reactive to light, extraocular eye movements intact, conjunctivae normal  ENT: tympanic membrane, external ear and ear canal normal bilaterally, nose without deformity, nasal mucosa and turbinates normal without polyps  Neck: supple and non-tender without mass, no thyromegaly or thyroid nodules, no cervical lymphadenopathy  Pulmonary/Chest: clear to auscultation bilaterally- no wheezes, rales or rhonchi, normal air movement, no respiratory distress  Cardiovascular: normal rate, regular rhythm, normal S1 and S2, no murmurs, rubs, clicks, or gallops, distal pulses intact, no carotid bruits  Abdomen: soft, non-tender, non-distended, normal bowel sounds, no masses or organomegaly  Extremities: no cyanosis, clubbing or edema  Musculoskeletal: normal range of motion, no joint swelling, deformity or tenderness  Neurologic: reflexes normal and symmetric, no cranial nerve deficit, gait, coordination and speech normal       Allergies   Allergen Reactions    Phenergan [Promethazine]      Sees words and numbers     Prior to Visit Medications    Medication Sig Taking? Authorizing Provider   Cyanocobalamin 1000 MCG CAPS Take 1,000 mcg by mouth daily Yes Mike Wayne MD   CRANBERRY PO Take by mouth daily Yes Mike Wayne MD   pantoprazole (PROTONIX) 40 MG tablet Take 1 tablet by mouth every morning (before breakfast) Yes Blossom Montelongo MD   potassium citrate (UROCIT-K) 10 MEQ (1080 MG) extended release tablet Take by mouth 3 times daily (with meals) 99mg Yes Mike Wayne MD   clopidogrel (PLAVIX) 75 MG tablet Take 1 tablet by mouth daily Yes Mike Wayne MD   aspirin 81 MG EC tablet Take 1 tablet by mouth daily as needed Yes Mike Wayne MD   metoprolol

## 2024-03-28 ENCOUNTER — TELEPHONE (OUTPATIENT)
Dept: VASCULAR SURGERY | Age: 74
End: 2024-03-28

## 2024-03-28 NOTE — TELEPHONE ENCOUNTER
Patient's wife called and is requesting Dr. Romo to review the CT scan of the abdomen and pelvis completed on 12/26/2023 at [place] and opine for further need for additional scans and when.

## 2024-04-10 NOTE — TELEPHONE ENCOUNTER
Spoke with Ryann, advised her that we requested Images be sent to [facility] PAC[facility]. Someone will call them back with recommendations.

## 2024-04-16 ENCOUNTER — TELEPHONE (OUTPATIENT)
Dept: VASCULAR SURGERY | Age: 74
End: 2024-04-16

## 2024-04-16 DIAGNOSIS — I72.8 SPLENIC ARTERY ANEURYSM (HCC): Primary | ICD-10-CM

## 2024-04-17 LAB — NONINV COLON CA DNA+OCC BLD SCRN STL QL: NEGATIVE

## 2024-06-20 SDOH — ECONOMIC STABILITY: FOOD INSECURITY: WITHIN THE PAST 12 MONTHS, YOU WORRIED THAT YOUR FOOD WOULD RUN OUT BEFORE YOU GOT MONEY TO BUY MORE.: NEVER TRUE

## 2024-06-20 SDOH — ECONOMIC STABILITY: INCOME INSECURITY: HOW HARD IS IT FOR YOU TO PAY FOR THE VERY BASICS LIKE FOOD, HOUSING, MEDICAL CARE, AND HEATING?: NOT VERY HARD

## 2024-06-20 SDOH — ECONOMIC STABILITY: FOOD INSECURITY: WITHIN THE PAST 12 MONTHS, THE FOOD YOU BOUGHT JUST DIDN'T LAST AND YOU DIDN'T HAVE MONEY TO GET MORE.: NEVER TRUE

## 2024-06-21 ENCOUNTER — OFFICE VISIT (OUTPATIENT)
Dept: INTERNAL MEDICINE CLINIC | Age: 74
End: 2024-06-21

## 2024-06-21 VITALS
BODY MASS INDEX: 26.04 KG/M2 | SYSTOLIC BLOOD PRESSURE: 130 MMHG | WEIGHT: 171.8 LBS | HEIGHT: 68 IN | HEART RATE: 54 BPM | OXYGEN SATURATION: 95 % | DIASTOLIC BLOOD PRESSURE: 82 MMHG

## 2024-06-21 DIAGNOSIS — I48.0 PAROXYSMAL ATRIAL FIBRILLATION (HCC): Chronic | ICD-10-CM

## 2024-06-21 DIAGNOSIS — I10 PRIMARY HYPERTENSION: Primary | ICD-10-CM

## 2024-06-21 DIAGNOSIS — E78.49 OTHER HYPERLIPIDEMIA: ICD-10-CM

## 2024-06-21 DIAGNOSIS — N20.0 RENAL STONES: ICD-10-CM

## 2024-06-21 ASSESSMENT — ENCOUNTER SYMPTOMS
SINUS PAIN: 0
BLOOD IN STOOL: 0
CONSTIPATION: 0
ABDOMINAL PAIN: 0
COLOR CHANGE: 0
SHORTNESS OF BREATH: 0
COUGH: 0
CHEST TIGHTNESS: 0
WHEEZING: 0

## 2024-06-21 NOTE — PROGRESS NOTES
intolerance, heat intolerance and polyuria.   Genitourinary:  Negative for dysuria, frequency and urgency.   Musculoskeletal:  Negative for arthralgias and myalgias.   Skin:  Negative for color change and rash.   Neurological:  Negative for weakness and headaches.   Hematological:  Negative for adenopathy. Does not bruise/bleed easily.   Psychiatric/Behavioral:  Negative for agitation, dysphoric mood and sleep disturbance.           Objective   Physical Exam  Vitals and nursing note reviewed.   Constitutional:       General: He is not in acute distress.     Appearance: Normal appearance.   HENT:      Head: Normocephalic and atraumatic.      Right Ear: Tympanic membrane normal.      Left Ear: Tympanic membrane normal.      Nose: Nose normal.   Eyes:      Extraocular Movements: Extraocular movements intact.      Conjunctiva/sclera: Conjunctivae normal.      Pupils: Pupils are equal, round, and reactive to light.   Neck:      Vascular: No carotid bruit.   Cardiovascular:      Rate and Rhythm: Normal rate and regular rhythm.      Pulses: Normal pulses.      Heart sounds: No murmur heard.  Pulmonary:      Effort: Pulmonary effort is normal. No respiratory distress.      Breath sounds: Normal breath sounds.   Abdominal:      General: Abdomen is flat. Bowel sounds are normal. There is no distension.      Palpations: Abdomen is soft.      Tenderness: There is no abdominal tenderness.   Musculoskeletal:         General: No swelling, tenderness or deformity.      Cervical back: Normal range of motion and neck supple. No rigidity or tenderness.      Right lower leg: No edema.      Left lower leg: No edema.   Lymphadenopathy:      Cervical: No cervical adenopathy.   Skin:     Coloration: Skin is not jaundiced.      Findings: No bruising, erythema or lesion.   Neurological:      General: No focal deficit present.      Mental Status: He is alert and oriented to person, place, and time.      Cranial Nerves: No cranial nerve

## 2024-09-09 ENCOUNTER — OFFICE VISIT (OUTPATIENT)
Dept: INTERNAL MEDICINE CLINIC | Age: 74
End: 2024-09-09

## 2024-09-09 VITALS
DIASTOLIC BLOOD PRESSURE: 82 MMHG | HEIGHT: 68 IN | WEIGHT: 176 LBS | OXYGEN SATURATION: 95 % | SYSTOLIC BLOOD PRESSURE: 132 MMHG | HEART RATE: 56 BPM | BODY MASS INDEX: 26.67 KG/M2

## 2024-09-09 DIAGNOSIS — E78.49 OTHER HYPERLIPIDEMIA: ICD-10-CM

## 2024-09-09 DIAGNOSIS — N20.0 RENAL STONES: ICD-10-CM

## 2024-09-09 DIAGNOSIS — I10 PRIMARY HYPERTENSION: ICD-10-CM

## 2024-09-09 DIAGNOSIS — I10 PRIMARY HYPERTENSION: Primary | ICD-10-CM

## 2024-09-09 DIAGNOSIS — I48.0 PAF (PAROXYSMAL ATRIAL FIBRILLATION) (HCC): ICD-10-CM

## 2024-09-09 PROBLEM — S37.011A RENAL HEMATOMA, RIGHT, INITIAL ENCOUNTER: Status: RESOLVED | Noted: 2023-12-23 | Resolved: 2024-09-09

## 2024-09-09 LAB
ALBUMIN SERPL-MCNC: 4.2 G/DL (ref 3.4–5)
ALBUMIN/GLOB SERPL: 1.8 {RATIO} (ref 1.1–2.2)
ALP SERPL-CCNC: 101 U/L (ref 40–129)
ALT SERPL-CCNC: 30 U/L (ref 10–40)
ANION GAP SERPL CALCULATED.3IONS-SCNC: 11 MMOL/L (ref 3–16)
AST SERPL-CCNC: 24 U/L (ref 15–37)
BASOPHILS # BLD: 0 K/UL (ref 0–0.2)
BASOPHILS NFR BLD: 0.4 %
BILIRUB SERPL-MCNC: 0.8 MG/DL (ref 0–1)
BUN SERPL-MCNC: 15 MG/DL (ref 7–20)
CALCIUM SERPL-MCNC: 8.8 MG/DL (ref 8.3–10.6)
CHLORIDE SERPL-SCNC: 107 MMOL/L (ref 99–110)
CHOLEST SERPL-MCNC: 91 MG/DL (ref 0–199)
CO2 SERPL-SCNC: 25 MMOL/L (ref 21–32)
CREAT SERPL-MCNC: 1.1 MG/DL (ref 0.8–1.3)
DEPRECATED RDW RBC AUTO: 13.1 % (ref 12.4–15.4)
EOSINOPHIL # BLD: 0.3 K/UL (ref 0–0.6)
EOSINOPHIL NFR BLD: 3.4 %
GFR SERPLBLD CREATININE-BSD FMLA CKD-EPI: 71 ML/MIN/{1.73_M2}
GLUCOSE SERPL-MCNC: 88 MG/DL (ref 70–99)
HCT VFR BLD AUTO: 40 % (ref 40.5–52.5)
HDLC SERPL-MCNC: 35 MG/DL (ref 40–60)
HGB BLD-MCNC: 13.9 G/DL (ref 13.5–17.5)
LDLC SERPL CALC-MCNC: 44 MG/DL
LYMPHOCYTES # BLD: 2.4 K/UL (ref 1–5.1)
LYMPHOCYTES NFR BLD: 26.2 %
MCH RBC QN AUTO: 31 PG (ref 26–34)
MCHC RBC AUTO-ENTMCNC: 34.7 G/DL (ref 31–36)
MCV RBC AUTO: 89.4 FL (ref 80–100)
MONOCYTES # BLD: 1 K/UL (ref 0–1.3)
MONOCYTES NFR BLD: 10.9 %
NEUTROPHILS # BLD: 5.4 K/UL (ref 1.7–7.7)
NEUTROPHILS NFR BLD: 59.1 %
PLATELET # BLD AUTO: 230 K/UL (ref 135–450)
PMV BLD AUTO: 9.4 FL (ref 5–10.5)
POTASSIUM SERPL-SCNC: 4.3 MMOL/L (ref 3.5–5.1)
PROT SERPL-MCNC: 6.6 G/DL (ref 6.4–8.2)
RBC # BLD AUTO: 4.48 M/UL (ref 4.2–5.9)
SODIUM SERPL-SCNC: 143 MMOL/L (ref 136–145)
TRIGL SERPL-MCNC: 62 MG/DL (ref 0–150)
TSH SERPL DL<=0.005 MIU/L-ACNC: 3.27 UIU/ML (ref 0.27–4.2)
VLDLC SERPL CALC-MCNC: 12 MG/DL
WBC # BLD AUTO: 9.1 K/UL (ref 4–11)

## 2024-09-09 ASSESSMENT — ENCOUNTER SYMPTOMS
CONSTIPATION: 0
CHEST TIGHTNESS: 0
WHEEZING: 0
ABDOMINAL PAIN: 0
COUGH: 0
SINUS PAIN: 0
COLOR CHANGE: 0
BLOOD IN STOOL: 0
SHORTNESS OF BREATH: 0

## 2024-12-09 ENCOUNTER — OFFICE VISIT (OUTPATIENT)
Dept: INTERNAL MEDICINE CLINIC | Age: 74
End: 2024-12-09

## 2024-12-09 VITALS
SYSTOLIC BLOOD PRESSURE: 130 MMHG | DIASTOLIC BLOOD PRESSURE: 74 MMHG | HEIGHT: 68 IN | HEART RATE: 63 BPM | OXYGEN SATURATION: 98 % | WEIGHT: 181.2 LBS | BODY MASS INDEX: 27.46 KG/M2

## 2024-12-09 DIAGNOSIS — I48.0 PAROXYSMAL ATRIAL FIBRILLATION (HCC): Primary | Chronic | ICD-10-CM

## 2024-12-09 DIAGNOSIS — E78.49 OTHER HYPERLIPIDEMIA: ICD-10-CM

## 2024-12-09 DIAGNOSIS — I10 PRIMARY HYPERTENSION: ICD-10-CM

## 2024-12-09 DIAGNOSIS — R60.0 LOCALIZED EDEMA: ICD-10-CM

## 2024-12-09 DIAGNOSIS — I48.0 PAROXYSMAL ATRIAL FIBRILLATION (HCC): Chronic | ICD-10-CM

## 2024-12-09 LAB
ALBUMIN SERPL-MCNC: 4.3 G/DL (ref 3.4–5)
ALBUMIN/GLOB SERPL: 1.7 {RATIO} (ref 1.1–2.2)
ALP SERPL-CCNC: 94 U/L (ref 40–129)
ALT SERPL-CCNC: 29 U/L (ref 10–40)
ANION GAP SERPL CALCULATED.3IONS-SCNC: 7 MMOL/L (ref 3–16)
AST SERPL-CCNC: 25 U/L (ref 15–37)
BILIRUB SERPL-MCNC: 0.8 MG/DL (ref 0–1)
BUN SERPL-MCNC: 17 MG/DL (ref 7–20)
CALCIUM SERPL-MCNC: 9.2 MG/DL (ref 8.3–10.6)
CHLORIDE SERPL-SCNC: 103 MMOL/L (ref 99–110)
CO2 SERPL-SCNC: 27 MMOL/L (ref 21–32)
CREAT SERPL-MCNC: 1.1 MG/DL (ref 0.8–1.3)
GFR SERPLBLD CREATININE-BSD FMLA CKD-EPI: 71 ML/MIN/{1.73_M2}
GLUCOSE SERPL-MCNC: 99 MG/DL (ref 70–99)
POTASSIUM SERPL-SCNC: 4.4 MMOL/L (ref 3.5–5.1)
PROT SERPL-MCNC: 6.8 G/DL (ref 6.4–8.2)
SODIUM SERPL-SCNC: 137 MMOL/L (ref 136–145)

## 2024-12-09 RX ORDER — FUROSEMIDE 20 MG/1
20 TABLET ORAL DAILY
Qty: 30 TABLET | Refills: 0 | Status: SHIPPED | OUTPATIENT
Start: 2024-12-09

## 2024-12-09 ASSESSMENT — ENCOUNTER SYMPTOMS
COLOR CHANGE: 0
SHORTNESS OF BREATH: 0
WHEEZING: 0
BLOOD IN STOOL: 0
ABDOMINAL PAIN: 0
CHEST TIGHTNESS: 0
COUGH: 0
CONSTIPATION: 0
SINUS PAIN: 0

## 2024-12-09 NOTE — PROGRESS NOTES
Rohit Kyle (:  1950) is a 74 y.o. male,Established patient, here for evaluation of the following chief complaint(s):  3 Month Follow-Up      Assessment & Plan   ASSESSMENT/PLAN:  1. Paroxysmal atrial fibrillation (HCC)  -     Comprehensive Metabolic Panel; Future  -     TSH; Future  -     Comprehensive Metabolic Panel; Future  2. Primary hypertension  -     TSH; Future  -     CBC with Auto Differential; Future  -     Comprehensive Metabolic Panel; Future  3. Other hyperlipidemia  -     Lipid Panel; Future  4. Localized edema  -     furosemide (LASIX) 20 MG tablet; Take 1 tablet by mouth daily, Disp-30 tablet, R-0Normal    Reviewed with patient his current situation his lower extremity swelling seems to have gotten a little bit worse at this stage we will repeat his liver function test if that looks normal we will have him use Lasix for 3 days to lose roughly about 3 pounds and then we can use it on as-needed basis going forward otherwise he is advised to stay active and walk on regular basis to help reduce the swelling in his legs    The patient blood pressure is very well-controlled we will continue his current medications and check his kidney function test    The patient A-fib seems to be doing very well and stable and we will continue to monitor clinic  Return in about 6 months (around 2025).         Subjective   SUBJECTIVE/OBJECTIVE:    Lab Review   Lab Results   Component Value Date/Time     2024 02:53 PM     2024 11:23 AM     2024 10:25 AM    K 4.3 2024 02:53 PM    K 4.6 2024 11:23 AM    K 4.2 2024 10:25 AM    K 4.3 2023 04:47 PM    K 4.7 2018 06:42 AM    CO2 25 2024 02:53 PM    CO2 28 2024 11:23 AM    CO2 22 2024 10:25 AM    BUN 15 2024 02:53 PM    BUN 12 2024 11:23 AM    BUN 11 2024 10:25 AM    CREATININE 1.1 2024 02:53 PM    CREATININE 1.0 2024 11:23 AM    CREATININE 0.9

## 2025-06-09 ENCOUNTER — OFFICE VISIT (OUTPATIENT)
Dept: INTERNAL MEDICINE CLINIC | Age: 75
End: 2025-06-09
Payer: MEDICARE

## 2025-06-09 VITALS
DIASTOLIC BLOOD PRESSURE: 80 MMHG | OXYGEN SATURATION: 96 % | HEART RATE: 57 BPM | BODY MASS INDEX: 27.13 KG/M2 | WEIGHT: 179 LBS | SYSTOLIC BLOOD PRESSURE: 120 MMHG | HEIGHT: 68 IN

## 2025-06-09 DIAGNOSIS — I10 PRIMARY HYPERTENSION: ICD-10-CM

## 2025-06-09 DIAGNOSIS — R35.0 BENIGN PROSTATIC HYPERPLASIA WITH URINARY FREQUENCY: ICD-10-CM

## 2025-06-09 DIAGNOSIS — I48.0 PAROXYSMAL ATRIAL FIBRILLATION (HCC): Chronic | ICD-10-CM

## 2025-06-09 DIAGNOSIS — E78.49 OTHER HYPERLIPIDEMIA: ICD-10-CM

## 2025-06-09 DIAGNOSIS — N40.1 BENIGN PROSTATIC HYPERPLASIA WITH URINARY FREQUENCY: ICD-10-CM

## 2025-06-09 DIAGNOSIS — K21.9 GASTROESOPHAGEAL REFLUX DISEASE WITHOUT ESOPHAGITIS: ICD-10-CM

## 2025-06-09 DIAGNOSIS — Z00.00 MEDICARE ANNUAL WELLNESS VISIT, SUBSEQUENT: Primary | ICD-10-CM

## 2025-06-09 LAB
ALBUMIN SERPL-MCNC: 4.2 G/DL (ref 3.4–5)
ALBUMIN/GLOB SERPL: 1.8 {RATIO} (ref 1.1–2.2)
ALP SERPL-CCNC: 95 U/L (ref 40–129)
ALT SERPL-CCNC: 33 U/L (ref 10–40)
ANION GAP SERPL CALCULATED.3IONS-SCNC: 8 MMOL/L (ref 3–16)
AST SERPL-CCNC: 25 U/L (ref 15–37)
BASOPHILS # BLD: 0 K/UL (ref 0–0.2)
BASOPHILS NFR BLD: 0.4 %
BILIRUB SERPL-MCNC: 0.9 MG/DL (ref 0–1)
BUN SERPL-MCNC: 14 MG/DL (ref 7–20)
CALCIUM SERPL-MCNC: 8.9 MG/DL (ref 8.3–10.6)
CHLORIDE SERPL-SCNC: 107 MMOL/L (ref 99–110)
CHOLEST SERPL-MCNC: 91 MG/DL (ref 0–199)
CO2 SERPL-SCNC: 26 MMOL/L (ref 21–32)
CREAT SERPL-MCNC: 1 MG/DL (ref 0.8–1.3)
DEPRECATED RDW RBC AUTO: 13.7 % (ref 12.4–15.4)
EOSINOPHIL # BLD: 0.2 K/UL (ref 0–0.6)
EOSINOPHIL NFR BLD: 2.2 %
GFR SERPLBLD CREATININE-BSD FMLA CKD-EPI: 79 ML/MIN/{1.73_M2}
GLUCOSE SERPL-MCNC: 106 MG/DL (ref 70–99)
HCT VFR BLD AUTO: 43.2 % (ref 40.5–52.5)
HDLC SERPL-MCNC: 27 MG/DL (ref 40–60)
HGB BLD-MCNC: 14.7 G/DL (ref 13.5–17.5)
LDLC SERPL CALC-MCNC: 43 MG/DL
LYMPHOCYTES # BLD: 2.3 K/UL (ref 1–5.1)
LYMPHOCYTES NFR BLD: 29 %
MCH RBC QN AUTO: 30.6 PG (ref 26–34)
MCHC RBC AUTO-ENTMCNC: 34.1 G/DL (ref 31–36)
MCV RBC AUTO: 89.6 FL (ref 80–100)
MONOCYTES # BLD: 0.7 K/UL (ref 0–1.3)
MONOCYTES NFR BLD: 8.9 %
NEUTROPHILS # BLD: 4.7 K/UL (ref 1.7–7.7)
NEUTROPHILS NFR BLD: 59.5 %
PLATELET # BLD AUTO: 221 K/UL (ref 135–450)
PMV BLD AUTO: 9.4 FL (ref 5–10.5)
POTASSIUM SERPL-SCNC: 4.4 MMOL/L (ref 3.5–5.1)
PROT SERPL-MCNC: 6.6 G/DL (ref 6.4–8.2)
RBC # BLD AUTO: 4.82 M/UL (ref 4.2–5.9)
SODIUM SERPL-SCNC: 141 MMOL/L (ref 136–145)
TRIGL SERPL-MCNC: 107 MG/DL (ref 0–150)
TSH SERPL DL<=0.005 MIU/L-ACNC: 1.95 UIU/ML (ref 0.27–4.2)
VLDLC SERPL CALC-MCNC: 21 MG/DL
WBC # BLD AUTO: 7.9 K/UL (ref 4–11)

## 2025-06-09 PROCEDURE — 3079F DIAST BP 80-89 MM HG: CPT | Performed by: INTERNAL MEDICINE

## 2025-06-09 PROCEDURE — 1036F TOBACCO NON-USER: CPT | Performed by: INTERNAL MEDICINE

## 2025-06-09 PROCEDURE — 1123F ACP DISCUSS/DSCN MKR DOCD: CPT | Performed by: INTERNAL MEDICINE

## 2025-06-09 PROCEDURE — 3017F COLORECTAL CA SCREEN DOC REV: CPT | Performed by: INTERNAL MEDICINE

## 2025-06-09 PROCEDURE — 99214 OFFICE O/P EST MOD 30 MIN: CPT | Performed by: INTERNAL MEDICINE

## 2025-06-09 PROCEDURE — 1159F MED LIST DOCD IN RCRD: CPT | Performed by: INTERNAL MEDICINE

## 2025-06-09 PROCEDURE — G8427 DOCREV CUR MEDS BY ELIG CLIN: HCPCS | Performed by: INTERNAL MEDICINE

## 2025-06-09 PROCEDURE — 3074F SYST BP LT 130 MM HG: CPT | Performed by: INTERNAL MEDICINE

## 2025-06-09 PROCEDURE — G8417 CALC BMI ABV UP PARAM F/U: HCPCS | Performed by: INTERNAL MEDICINE

## 2025-06-09 PROCEDURE — G0439 PPPS, SUBSEQ VISIT: HCPCS | Performed by: INTERNAL MEDICINE

## 2025-06-09 PROCEDURE — 1160F RVW MEDS BY RX/DR IN RCRD: CPT | Performed by: INTERNAL MEDICINE

## 2025-06-09 SDOH — HEALTH STABILITY: PHYSICAL HEALTH: ON AVERAGE, HOW MANY DAYS PER WEEK DO YOU ENGAGE IN MODERATE TO STRENUOUS EXERCISE (LIKE A BRISK WALK)?: 5 DAYS

## 2025-06-09 SDOH — ECONOMIC STABILITY: FOOD INSECURITY: WITHIN THE PAST 12 MONTHS, YOU WORRIED THAT YOUR FOOD WOULD RUN OUT BEFORE YOU GOT MONEY TO BUY MORE.: NEVER TRUE

## 2025-06-09 SDOH — HEALTH STABILITY: PHYSICAL HEALTH: ON AVERAGE, HOW MANY MINUTES DO YOU ENGAGE IN EXERCISE AT THIS LEVEL?: 20 MIN

## 2025-06-09 SDOH — ECONOMIC STABILITY: FOOD INSECURITY: WITHIN THE PAST 12 MONTHS, THE FOOD YOU BOUGHT JUST DIDN'T LAST AND YOU DIDN'T HAVE MONEY TO GET MORE.: NEVER TRUE

## 2025-06-09 SDOH — ECONOMIC STABILITY: INCOME INSECURITY: IN THE LAST 12 MONTHS, WAS THERE A TIME WHEN YOU WERE NOT ABLE TO PAY THE MORTGAGE OR RENT ON TIME?: NO

## 2025-06-09 SDOH — ECONOMIC STABILITY: TRANSPORTATION INSECURITY
IN THE PAST 12 MONTHS, HAS THE LACK OF TRANSPORTATION KEPT YOU FROM MEDICAL APPOINTMENTS OR FROM GETTING MEDICATIONS?: NO

## 2025-06-09 ASSESSMENT — LIFESTYLE VARIABLES
HOW OFTEN DO YOU HAVE A DRINK CONTAINING ALCOHOL: NEVER
HOW OFTEN DO YOU HAVE SIX OR MORE DRINKS ON ONE OCCASION: 1
HOW OFTEN DO YOU HAVE A DRINK CONTAINING ALCOHOL: 1
HOW MANY STANDARD DRINKS CONTAINING ALCOHOL DO YOU HAVE ON A TYPICAL DAY: PATIENT DOES NOT DRINK
HOW MANY STANDARD DRINKS CONTAINING ALCOHOL DO YOU HAVE ON A TYPICAL DAY: 0

## 2025-06-09 ASSESSMENT — ENCOUNTER SYMPTOMS
SHORTNESS OF BREATH: 0
CONSTIPATION: 0
BLOOD IN STOOL: 0
ABDOMINAL PAIN: 0
COLOR CHANGE: 0
CHEST TIGHTNESS: 0
WHEEZING: 0
COUGH: 0
SINUS PAIN: 0

## 2025-06-09 ASSESSMENT — PATIENT HEALTH QUESTIONNAIRE - PHQ9
SUM OF ALL RESPONSES TO PHQ QUESTIONS 1-9: 0
1. LITTLE INTEREST OR PLEASURE IN DOING THINGS: NOT AT ALL
2. FEELING DOWN, DEPRESSED OR HOPELESS: NOT AT ALL

## 2025-06-09 NOTE — PATIENT INSTRUCTIONS

## 2025-06-09 NOTE — PROGRESS NOTES
No murmur heard.  Pulmonary:      Effort: Pulmonary effort is normal. No respiratory distress.      Breath sounds: Normal breath sounds.   Abdominal:      General: Abdomen is flat. Bowel sounds are normal. There is no distension.      Palpations: Abdomen is soft.      Tenderness: There is no abdominal tenderness.   Musculoskeletal:         General: No swelling, tenderness or deformity.      Cervical back: Normal range of motion and neck supple. No rigidity or tenderness.      Right lower leg: No edema.      Left lower leg: No edema.   Lymphadenopathy:      Cervical: No cervical adenopathy.   Skin:     Coloration: Skin is not jaundiced.      Findings: No bruising, erythema or lesion.   Neurological:      General: No focal deficit present.      Mental Status: He is alert and oriented to person, place, and time.      Cranial Nerves: No cranial nerve deficit.      Motor: No weakness.      Gait: Gait normal.            This dictation was generated by voice recognition computer software.  Although all attempts are made to edit the dictation for accuracy, there may be errors in the transcription that are not intended.    An electronic signature was used to authenticate this note.    --Blossom Montelongo MD

## 2025-06-10 ENCOUNTER — RESULTS FOLLOW-UP (OUTPATIENT)
Dept: INTERNAL MEDICINE CLINIC | Age: 75
End: 2025-06-10

## 2025-07-24 ENCOUNTER — TELEPHONE (OUTPATIENT)
Dept: INTERNAL MEDICINE CLINIC | Age: 75
End: 2025-07-24

## 2025-07-24 ENCOUNTER — OFFICE VISIT (OUTPATIENT)
Dept: INTERNAL MEDICINE CLINIC | Age: 75
End: 2025-07-24

## 2025-07-24 VITALS
DIASTOLIC BLOOD PRESSURE: 98 MMHG | HEART RATE: 65 BPM | BODY MASS INDEX: 27.86 KG/M2 | OXYGEN SATURATION: 98 % | SYSTOLIC BLOOD PRESSURE: 150 MMHG | WEIGHT: 183.2 LBS

## 2025-07-24 DIAGNOSIS — L02.212 ABSCESS OF BACK: Primary | ICD-10-CM

## 2025-07-24 RX ORDER — SULFAMETHOXAZOLE AND TRIMETHOPRIM 800; 160 MG/1; MG/1
1 TABLET ORAL 2 TIMES DAILY
Qty: 14 TABLET | Refills: 0 | Status: SHIPPED | OUTPATIENT
Start: 2025-07-24 | End: 2025-07-31

## 2025-07-24 NOTE — PROGRESS NOTES
prolonged at 490 in 2023  - Continue current heart medication and follow up with cardiologist as scheduled  - HR stable at this time. BP elevated, will be rechecked before leaving. He is under a lot of stress with his wife undergoing medical treatment for her cancer today.   - No new medications or therapies prescribed during this visit    Elevated blood pressure:  - Mentioned blood pressure was a little high, likely due to stress  - No specific physical exam findings or test results discussed  - Continue monitoring blood pressure and manage stress levels  - No new medications or therapies prescribed during this visit  - has follow up with PCP      No follow-ups on file.         Subjective   SUBJECTIVE/OBJECTIVE:    Lab Review   Lab Results   Component Value Date/Time     06/09/2025 10:36 AM     12/09/2024 10:39 AM     09/09/2024 02:53 PM    K 4.4 06/09/2025 10:36 AM    K 4.4 12/09/2024 10:39 AM    K 4.3 09/09/2024 02:53 PM    K 4.3 12/09/2023 04:47 PM    K 4.7 06/30/2018 06:42 AM    CO2 26 06/09/2025 10:36 AM    CO2 27 12/09/2024 10:39 AM    CO2 25 09/09/2024 02:53 PM    BUN 14 06/09/2025 10:36 AM    BUN 17 12/09/2024 10:39 AM    BUN 15 09/09/2024 02:53 PM    CREATININE 1.0 06/09/2025 10:36 AM    CREATININE 1.1 12/09/2024 10:39 AM    CREATININE 1.1 09/09/2024 02:53 PM    GLUCOSE 106 06/09/2025 10:36 AM    GLUCOSE 99 12/09/2024 10:39 AM    GLUCOSE 88 09/09/2024 02:53 PM    CALCIUM 8.9 06/09/2025 10:36 AM    CALCIUM 9.2 12/09/2024 10:39 AM    CALCIUM 8.8 09/09/2024 02:53 PM     Lab Results   Component Value Date/Time    WBC 7.9 06/09/2025 10:36 AM    WBC 9.1 09/09/2024 02:53 PM    WBC 7.7 03/21/2024 11:23 AM    HGB 14.7 06/09/2025 10:36 AM    HGB 13.9 09/09/2024 02:53 PM    HGB 14.2 03/21/2024 11:23 AM    HCT 43.2 06/09/2025 10:36 AM    HCT 40.0 09/09/2024 02:53 PM    HCT 41.3 03/21/2024 11:23 AM    MCV 89.6 06/09/2025 10:36 AM    MCV 89.4 09/09/2024 02:53 PM    MCV 85.5 03/21/2024 11:23 AM    PLT

## 2025-07-24 NOTE — TELEPHONE ENCOUNTER
Pt has a boil on his back that has been oozing and growing but not only grew but no has three open spots on the right side of the boil he would like antibiotics. He has had a boil for several years and it is getting bigger and oozing.

## 2025-07-31 ENCOUNTER — OFFICE VISIT (OUTPATIENT)
Dept: SURGERY | Age: 75
End: 2025-07-31
Payer: MEDICARE

## 2025-07-31 VITALS
DIASTOLIC BLOOD PRESSURE: 82 MMHG | BODY MASS INDEX: 27.25 KG/M2 | WEIGHT: 179.8 LBS | SYSTOLIC BLOOD PRESSURE: 126 MMHG | HEIGHT: 68 IN

## 2025-07-31 DIAGNOSIS — L08.9 INFECTED SEBACEOUS CYST: Primary | ICD-10-CM

## 2025-07-31 DIAGNOSIS — L72.3 INFECTED SEBACEOUS CYST: Primary | ICD-10-CM

## 2025-07-31 PROCEDURE — G8427 DOCREV CUR MEDS BY ELIG CLIN: HCPCS | Performed by: SURGERY

## 2025-07-31 PROCEDURE — 1036F TOBACCO NON-USER: CPT | Performed by: SURGERY

## 2025-07-31 PROCEDURE — 1123F ACP DISCUSS/DSCN MKR DOCD: CPT | Performed by: SURGERY

## 2025-07-31 PROCEDURE — 99213 OFFICE O/P EST LOW 20 MIN: CPT | Performed by: SURGERY

## 2025-07-31 PROCEDURE — 3017F COLORECTAL CA SCREEN DOC REV: CPT | Performed by: SURGERY

## 2025-07-31 PROCEDURE — 3074F SYST BP LT 130 MM HG: CPT | Performed by: SURGERY

## 2025-07-31 PROCEDURE — 1159F MED LIST DOCD IN RCRD: CPT | Performed by: SURGERY

## 2025-07-31 PROCEDURE — 1125F AMNT PAIN NOTED PAIN PRSNT: CPT | Performed by: SURGERY

## 2025-07-31 PROCEDURE — G8417 CALC BMI ABV UP PARAM F/U: HCPCS | Performed by: SURGERY

## 2025-07-31 PROCEDURE — 3079F DIAST BP 80-89 MM HG: CPT | Performed by: SURGERY

## 2025-07-31 ASSESSMENT — ENCOUNTER SYMPTOMS
EYE ITCHING: 0
EYE DISCHARGE: 0
ABDOMINAL PAIN: 0
BACK PAIN: 0
CHEST TIGHTNESS: 0
APNEA: 0
ABDOMINAL DISTENTION: 0
COLOR CHANGE: 1

## 2025-07-31 NOTE — PROGRESS NOTES
:     Rohit Kyle is a 75 y.o. male     CC: Infected sebaceous cyst of the back    HPI: 75-year-old male who presents for evaluation of an infected sebaceous cyst of the back.  It has improved significantly with Bactrim.        Past Medical History:   Diagnosis Date    Atrial fibrillation (HCC)     PAROXYSMAL    Chronic kidney disease     Diverticulitis     GERD (gastroesophageal reflux disease)     Kidney stones     PONV (postoperative nausea and vomiting)     Snores     Splenic artery aneurysm     2.2 CM AS OF 4-2018       Phenergan [promethazine]     Past Surgical History:   Procedure Laterality Date    BACK SURGERY      CHOLECYSTECTOMY, LAPAROSCOPIC  12-27-10    with cholangiogram    COLONOSCOPY  10/28/14    CYSTOSCOPY  06/29/2018    flex cysto placement, UPJ catheter placement with L percutaneous  stent    CYSTOSCOPY  07/31/2018    CYSTOSCOPY LEFT STENT REMOVAL, LEFT RETROGRADE PYELOGRAM, LEFT FLEXIBLE URETEROSCOPY WITH HOLMIUM LASER LITHOTRIPSY, STONE MANIPULATION AND EXTRACTION, LEFT STENT REPLACEMENT    CYSTOSCOPY Right 12/21/2023    CYSTOSCOPY RIGHT RETROGRADE PYELOGRAM WITH RIGHT URETEROSCOPY WITH HOLMIUM LASER LITHOTRIPSY, STONE MANIPULATION, RIGHT STENT PLACEMENT WITH CALYXO EVACUATION ASPIRATION CATHETER-=FORTEC #[id number] performed by Jamie Paul MD at [facility] OR    LITHOTRIPSY          Prior to Visit Medications    Medication Sig Taking? Authorizing Provider   sulfamethoxazole-trimethoprim (BACTRIM DS;SEPTRA DS) 800-160 MG per tablet Take 1 tablet by mouth 2 times daily for 7 days Yes Shadi Henley P, DO   NONFORMULARY Stone stopper Yes ProviderMike MD   furosemide (LASIX) 20 MG tablet Take 1 tablet by mouth daily Yes Blossom Montelongo MD   Cyanocobalamin 1000 MCG CAPS Take 1,000 mcg by mouth daily Yes Provider, MD Mike   CRANBERRY PO Take by mouth daily Yes Provider, MD Mike   potassium citrate (UROCIT-K) 10 MEQ (1080 MG) extended release tablet Take by mouth 3 times daily

## 2025-08-07 ENCOUNTER — PROCEDURE VISIT (OUTPATIENT)
Dept: SURGERY | Age: 75
End: 2025-08-07

## 2025-08-07 VITALS — DIASTOLIC BLOOD PRESSURE: 78 MMHG | SYSTOLIC BLOOD PRESSURE: 131 MMHG

## 2025-08-07 DIAGNOSIS — L72.3 INFECTED SEBACEOUS CYST: Primary | ICD-10-CM

## 2025-08-07 DIAGNOSIS — L08.9 INFECTED SEBACEOUS CYST: Primary | ICD-10-CM

## 2025-08-07 RX ORDER — LIDOCAINE HYDROCHLORIDE AND EPINEPHRINE 10; 10 MG/ML; UG/ML
10 INJECTION, SOLUTION INFILTRATION; PERINEURAL ONCE
Status: COMPLETED | OUTPATIENT
Start: 2025-08-07 | End: 2025-08-07

## 2025-08-07 RX ADMIN — LIDOCAINE HYDROCHLORIDE AND EPINEPHRINE 10 ML: 10; 10 INJECTION, SOLUTION INFILTRATION; PERINEURAL at 14:26

## 2025-08-08 ENCOUNTER — TELEPHONE (OUTPATIENT)
Dept: SURGERY | Age: 75
End: 2025-08-08

## 2025-08-12 ENCOUNTER — RESULTS FOLLOW-UP (OUTPATIENT)
Dept: SURGERY | Age: 75
End: 2025-08-12

## (undated) DEVICE — SURGICAL SUCTION CONNECTING TUBE WITH MALE CONNECTOR AND SUCTION CLAMP, 2 FT. LONG (.6 M), 5 MM I.D.: Brand: CONMED

## (undated) DEVICE — BAG DRAINAGE NS

## (undated) DEVICE — SOLUTION IRRIG 1000ML STRL H2O USP PLAS POUR BTL

## (undated) DEVICE — GLOVE ORANGE PI 7   MSG9070

## (undated) DEVICE — SINGLE ACTION PUMPING SYSTEM

## (undated) DEVICE — BLANKET WRM W29.9XL79.1IN UP BODY FORC AIR MISTRAL-AIR

## (undated) DEVICE — Device

## (undated) DEVICE — SYSTEM ASPIR CVAC INTRACORPOREAL LITHO SUCTION STRL LF

## (undated) DEVICE — Y-TYPE TUR/BLADDER IRRIGATION SET, REGULATING CLAMP

## (undated) DEVICE — SYRINGE MED 10ML SLIP TIP BLNT FILL AND LUERLOCK DISP

## (undated) DEVICE — Device: Brand: MEDEX

## (undated) DEVICE — WET SKIN PREP TRAY: Brand: MEDLINE INDUSTRIES, INC.

## (undated) DEVICE — ADAPTER URO SCP UROLOK LL

## (undated) DEVICE — GUIDEWIRE ENDOSCP L150CM DIA0.035IN TIP 3CM PTFE NIT

## (undated) DEVICE — 3M™ STERI-STRIP™ REINFORCED ADHESIVE SKIN CLOSURES, R1547, 1/2 IN X 4 IN (12 MM X 100 MM), 6 STRIPS/ENVELOPE: Brand: 3M™ STERI-STRIP™

## (undated) DEVICE — SOLUTION IRRIGATION STRL H2O 1000 ML UROMATIC CONTAINER

## (undated) DEVICE — CYSTO PACK: Brand: MEDLINE INDUSTRIES, INC.

## (undated) DEVICE — URETERAL ACCESS SHEATH SET: Brand: NAVIGATOR HD